# Patient Record
Sex: FEMALE | Race: WHITE | NOT HISPANIC OR LATINO | URBAN - METROPOLITAN AREA
[De-identification: names, ages, dates, MRNs, and addresses within clinical notes are randomized per-mention and may not be internally consistent; named-entity substitution may affect disease eponyms.]

---

## 2017-04-10 ENCOUNTER — COMMUNICATION - HEALTHEAST (OUTPATIENT)
Dept: SCHEDULING | Facility: CLINIC | Age: 32
End: 2017-04-10

## 2017-04-10 ENCOUNTER — OFFICE VISIT - HEALTHEAST (OUTPATIENT)
Dept: INTERNAL MEDICINE | Facility: CLINIC | Age: 32
End: 2017-04-10

## 2017-04-10 DIAGNOSIS — J32.9 SINUSITIS: ICD-10-CM

## 2017-04-24 ENCOUNTER — OFFICE VISIT - HEALTHEAST (OUTPATIENT)
Dept: FAMILY MEDICINE | Facility: CLINIC | Age: 32
End: 2017-04-24

## 2017-04-24 DIAGNOSIS — H66.92 LEFT ACUTE OTITIS MEDIA: ICD-10-CM

## 2017-04-24 DIAGNOSIS — R42 DIZZINESS: ICD-10-CM

## 2018-08-04 ENCOUNTER — OFFICE VISIT - HEALTHEAST (OUTPATIENT)
Dept: FAMILY MEDICINE | Facility: CLINIC | Age: 33
End: 2018-08-04

## 2018-08-04 ENCOUNTER — HOSPITAL ENCOUNTER (OUTPATIENT)
Dept: ULTRASOUND IMAGING | Facility: CLINIC | Age: 33
Discharge: HOME | End: 2018-08-04
Attending: FAMILY MEDICINE

## 2018-08-04 ENCOUNTER — HOSPITAL ENCOUNTER (OUTPATIENT)
Dept: CT IMAGING | Facility: CLINIC | Age: 33
Discharge: HOME | End: 2018-08-04
Attending: FAMILY MEDICINE

## 2018-08-04 DIAGNOSIS — R10.31 ABDOMINAL PAIN, RLQ (RIGHT LOWER QUADRANT): ICD-10-CM

## 2018-08-04 DIAGNOSIS — N94.9 ADNEXAL CYST: ICD-10-CM

## 2018-08-04 DIAGNOSIS — N83.201 RIGHT OVARIAN CYST: ICD-10-CM

## 2018-08-04 LAB
ALBUMIN UR-MCNC: NEGATIVE MG/DL
APPEARANCE UR: CLEAR
BILIRUB UR QL STRIP: NEGATIVE
COLOR UR AUTO: YELLOW
ERYTHROCYTE [DISTWIDTH] IN BLOOD BY AUTOMATED COUNT: 14.3 % (ref 11–14.5)
GLUCOSE UR STRIP-MCNC: NEGATIVE MG/DL
HCT VFR BLD AUTO: 39.5 % (ref 35–47)
HGB BLD-MCNC: 12.6 G/DL (ref 12–16)
HGB UR QL STRIP: NEGATIVE
KETONES UR STRIP-MCNC: NEGATIVE MG/DL
LEUKOCYTE ESTERASE UR QL STRIP: NEGATIVE
MCH RBC QN AUTO: 26.1 PG (ref 27–34)
MCHC RBC AUTO-ENTMCNC: 32 G/DL (ref 32–36)
MCV RBC AUTO: 82 FL (ref 80–100)
NITRATE UR QL: NEGATIVE
PH UR STRIP: 6.5 [PH] (ref 5–8)
PLATELET # BLD AUTO: 376 THOU/UL (ref 140–440)
PMV BLD AUTO: 7.7 FL (ref 7–10)
RBC # BLD AUTO: 4.84 MILL/UL (ref 3.8–5.4)
SP GR UR STRIP: 1.01 (ref 1–1.03)
UROBILINOGEN UR STRIP-ACNC: NORMAL
WBC: 8.7 THOU/UL (ref 4–11)

## 2018-08-06 ENCOUNTER — COMMUNICATION - HEALTHEAST (OUTPATIENT)
Dept: FAMILY MEDICINE | Facility: CLINIC | Age: 33
End: 2018-08-06

## 2018-10-25 ENCOUNTER — OFFICE VISIT - HEALTHEAST (OUTPATIENT)
Dept: FAMILY MEDICINE | Facility: CLINIC | Age: 33
End: 2018-10-25

## 2018-10-25 DIAGNOSIS — F33.0 MAJOR DEPRESSIVE DISORDER, RECURRENT EPISODE, MILD (H): ICD-10-CM

## 2018-10-25 DIAGNOSIS — R53.83 FATIGUE: ICD-10-CM

## 2018-10-25 DIAGNOSIS — E66.01 MORBID OBESITY (H): ICD-10-CM

## 2018-10-25 DIAGNOSIS — Z13.220 SCREENING FOR LIPID DISORDERS: ICD-10-CM

## 2018-10-25 DIAGNOSIS — Z00.00 ROUTINE HEALTH MAINTENANCE: ICD-10-CM

## 2018-10-25 DIAGNOSIS — Z13.1 SCREENING FOR DIABETES MELLITUS: ICD-10-CM

## 2018-10-25 LAB
ANION GAP SERPL CALCULATED.3IONS-SCNC: 14 MMOL/L (ref 5–18)
BUN SERPL-MCNC: 7 MG/DL (ref 8–22)
CALCIUM SERPL-MCNC: 9.6 MG/DL (ref 8.5–10.5)
CHLORIDE BLD-SCNC: 102 MMOL/L (ref 98–107)
CHOLEST SERPL-MCNC: 193 MG/DL
CO2 SERPL-SCNC: 22 MMOL/L (ref 22–31)
CREAT SERPL-MCNC: 0.68 MG/DL (ref 0.6–1.1)
ERYTHROCYTE [DISTWIDTH] IN BLOOD BY AUTOMATED COUNT: 14.3 % (ref 11–14.5)
FASTING STATUS PATIENT QL REPORTED: ABNORMAL
GFR SERPL CREATININE-BSD FRML MDRD: >60 ML/MIN/1.73M2
GLUCOSE BLD-MCNC: 82 MG/DL (ref 70–125)
HCT VFR BLD AUTO: 39.3 % (ref 35–47)
HDLC SERPL-MCNC: 48 MG/DL
HGB BLD-MCNC: 13.1 G/DL (ref 12–16)
LDLC SERPL CALC-MCNC: 112 MG/DL
MCH RBC QN AUTO: 27.1 PG (ref 27–34)
MCHC RBC AUTO-ENTMCNC: 33.3 G/DL (ref 32–36)
MCV RBC AUTO: 82 FL (ref 80–100)
PLATELET # BLD AUTO: 379 THOU/UL (ref 140–440)
PMV BLD AUTO: 7.6 FL (ref 7–10)
POTASSIUM BLD-SCNC: 4.1 MMOL/L (ref 3.5–5)
RBC # BLD AUTO: 4.82 MILL/UL (ref 3.8–5.4)
SODIUM SERPL-SCNC: 138 MMOL/L (ref 136–145)
TRIGL SERPL-MCNC: 164 MG/DL
TSH SERPL DL<=0.005 MIU/L-ACNC: 2.77 UIU/ML (ref 0.3–5)
WBC: 10.8 THOU/UL (ref 4–11)

## 2018-10-25 ASSESSMENT — MIFFLIN-ST. JEOR: SCORE: 1771.01

## 2018-10-26 ENCOUNTER — COMMUNICATION - HEALTHEAST (OUTPATIENT)
Dept: FAMILY MEDICINE | Facility: CLINIC | Age: 33
End: 2018-10-26

## 2018-10-26 DIAGNOSIS — F33.0 MAJOR DEPRESSIVE DISORDER, RECURRENT EPISODE, MILD (H): ICD-10-CM

## 2018-10-26 LAB
25(OH)D3 SERPL-MCNC: 28.5 NG/ML (ref 30–80)
25(OH)D3 SERPL-MCNC: 28.5 NG/ML (ref 30–80)

## 2018-11-26 ENCOUNTER — COMMUNICATION - HEALTHEAST (OUTPATIENT)
Dept: FAMILY MEDICINE | Facility: CLINIC | Age: 33
End: 2018-11-26

## 2018-12-23 ENCOUNTER — COMMUNICATION - HEALTHEAST (OUTPATIENT)
Dept: FAMILY MEDICINE | Facility: CLINIC | Age: 33
End: 2018-12-23

## 2018-12-23 DIAGNOSIS — E66.813 CLASS 3 SEVERE OBESITY WITHOUT SERIOUS COMORBIDITY WITH BODY MASS INDEX (BMI) OF 50.0 TO 59.9 IN ADULT, UNSPECIFIED OBESITY TYPE (H): ICD-10-CM

## 2018-12-23 DIAGNOSIS — E66.01 CLASS 3 SEVERE OBESITY WITHOUT SERIOUS COMORBIDITY WITH BODY MASS INDEX (BMI) OF 50.0 TO 59.9 IN ADULT, UNSPECIFIED OBESITY TYPE (H): ICD-10-CM

## 2018-12-23 DIAGNOSIS — F33.0 MAJOR DEPRESSIVE DISORDER, RECURRENT EPISODE, MILD (H): ICD-10-CM

## 2018-12-27 ENCOUNTER — OFFICE VISIT - HEALTHEAST (OUTPATIENT)
Dept: FAMILY MEDICINE | Facility: CLINIC | Age: 33
End: 2018-12-27

## 2018-12-27 ENCOUNTER — RECORDS - HEALTHEAST (OUTPATIENT)
Dept: GENERAL RADIOLOGY | Facility: CLINIC | Age: 33
End: 2018-12-27

## 2018-12-27 DIAGNOSIS — L03.011 CELLULITIS OF FINGER OF RIGHT HAND: ICD-10-CM

## 2018-12-27 DIAGNOSIS — M79.644 PAIN OF RIGHT THUMB: ICD-10-CM

## 2018-12-27 DIAGNOSIS — M79.644 PAIN IN RIGHT FINGER(S): ICD-10-CM

## 2019-01-28 ENCOUNTER — AMBULATORY - HEALTHEAST (OUTPATIENT)
Dept: LAB | Facility: CLINIC | Age: 34
End: 2019-01-28

## 2019-01-28 ENCOUNTER — COMMUNICATION - HEALTHEAST (OUTPATIENT)
Dept: SURGERY | Facility: CLINIC | Age: 34
End: 2019-01-28

## 2019-01-28 ENCOUNTER — OFFICE VISIT - HEALTHEAST (OUTPATIENT)
Dept: SURGERY | Facility: CLINIC | Age: 34
End: 2019-01-28

## 2019-01-28 DIAGNOSIS — R79.89 LOW VITAMIN D LEVEL: ICD-10-CM

## 2019-01-28 LAB
HBA1C MFR BLD: 5.5 % (ref 4.2–6.1)
VIT B12 SERPL-MCNC: 586 PG/ML (ref 213–816)

## 2019-01-28 ASSESSMENT — MIFFLIN-ST. JEOR: SCORE: 1794.82

## 2019-03-18 ENCOUNTER — OFFICE VISIT - HEALTHEAST (OUTPATIENT)
Dept: SURGERY | Facility: CLINIC | Age: 34
End: 2019-03-18

## 2019-03-18 DIAGNOSIS — Z71.3 NUTRITIONAL COUNSELING: ICD-10-CM

## 2019-03-18 DIAGNOSIS — E66.01 OBESITY, MORBID, BMI 50 OR HIGHER (H): ICD-10-CM

## 2019-03-18 DIAGNOSIS — E55.9 VITAMIN D DEFICIENCY: ICD-10-CM

## 2019-03-18 ASSESSMENT — MIFFLIN-ST. JEOR: SCORE: 1828.27

## 2020-01-08 ENCOUNTER — COMMUNICATION - HEALTHEAST (OUTPATIENT)
Dept: FAMILY MEDICINE | Facility: CLINIC | Age: 35
End: 2020-01-08

## 2020-01-08 DIAGNOSIS — F33.0 MAJOR DEPRESSIVE DISORDER, RECURRENT EPISODE, MILD (H): ICD-10-CM

## 2020-01-31 ENCOUNTER — OFFICE VISIT - HEALTHEAST (OUTPATIENT)
Dept: FAMILY MEDICINE | Facility: CLINIC | Age: 35
End: 2020-01-31

## 2020-01-31 DIAGNOSIS — Z13.1 SCREENING FOR DIABETES MELLITUS: ICD-10-CM

## 2020-01-31 DIAGNOSIS — M77.42 METATARSALGIA OF LEFT FOOT: ICD-10-CM

## 2020-01-31 DIAGNOSIS — Z00.00 ROUTINE HEALTH MAINTENANCE: ICD-10-CM

## 2020-01-31 DIAGNOSIS — Z12.4 SCREENING FOR MALIGNANT NEOPLASM OF CERVIX: ICD-10-CM

## 2020-01-31 DIAGNOSIS — F33.0 MAJOR DEPRESSIVE DISORDER, RECURRENT EPISODE, MILD (H): ICD-10-CM

## 2020-01-31 DIAGNOSIS — Z13.220 SCREENING FOR LIPID DISORDERS: ICD-10-CM

## 2020-01-31 DIAGNOSIS — E55.9 VITAMIN D DEFICIENCY: ICD-10-CM

## 2020-01-31 RX ORDER — CHLORAL HYDRATE 500 MG
CAPSULE ORAL
Status: SHIPPED | COMMUNITY
Start: 2020-01-31

## 2020-01-31 ASSESSMENT — ANXIETY QUESTIONNAIRES
7. FEELING AFRAID AS IF SOMETHING AWFUL MIGHT HAPPEN: SEVERAL DAYS
1. FEELING NERVOUS, ANXIOUS, OR ON EDGE: MORE THAN HALF THE DAYS
3. WORRYING TOO MUCH ABOUT DIFFERENT THINGS: NEARLY EVERY DAY
GAD7 TOTAL SCORE: 14
2. NOT BEING ABLE TO STOP OR CONTROL WORRYING: NEARLY EVERY DAY
6. BECOMING EASILY ANNOYED OR IRRITABLE: MORE THAN HALF THE DAYS
IF YOU CHECKED OFF ANY PROBLEMS ON THIS QUESTIONNAIRE, HOW DIFFICULT HAVE THESE PROBLEMS MADE IT FOR YOU TO DO YOUR WORK, TAKE CARE OF THINGS AT HOME, OR GET ALONG WITH OTHER PEOPLE: VERY DIFFICULT
4. TROUBLE RELAXING: MORE THAN HALF THE DAYS
5. BEING SO RESTLESS THAT IT IS HARD TO SIT STILL: SEVERAL DAYS

## 2020-01-31 ASSESSMENT — PATIENT HEALTH QUESTIONNAIRE - PHQ9: SUM OF ALL RESPONSES TO PHQ QUESTIONS 1-9: 14

## 2020-01-31 ASSESSMENT — MIFFLIN-ST. JEOR: SCORE: 1931.47

## 2020-02-03 LAB
HPV SOURCE: NORMAL
HUMAN PAPILLOMA VIRUS 16 DNA: NEGATIVE
HUMAN PAPILLOMA VIRUS 18 DNA: NEGATIVE
HUMAN PAPILLOMA VIRUS FINAL DIAGNOSIS: NORMAL
HUMAN PAPILLOMA VIRUS OTHER HR: NEGATIVE
SPECIMEN DESCRIPTION: NORMAL

## 2020-02-06 ENCOUNTER — COMMUNICATION - HEALTHEAST (OUTPATIENT)
Dept: FAMILY MEDICINE | Facility: CLINIC | Age: 35
End: 2020-02-06

## 2020-02-06 DIAGNOSIS — F33.0 MAJOR DEPRESSIVE DISORDER, RECURRENT EPISODE, MILD (H): ICD-10-CM

## 2020-02-10 LAB
BKR LAB AP ABNORMAL BLEEDING: NO
BKR LAB AP BIRTH CONTROL/HORMONES: NORMAL
BKR LAB AP CERVICAL APPEARANCE: NORMAL
BKR LAB AP GYN ADEQUACY: NORMAL
BKR LAB AP GYN INTERPRETATION: NORMAL
BKR LAB AP HPV REFLEX: NORMAL
BKR LAB AP LMP: NORMAL
BKR LAB AP PATIENT STATUS: NORMAL
BKR LAB AP PREVIOUS ABNORMAL: NORMAL
BKR LAB AP PREVIOUS NORMAL: 2015
HIGH RISK?: NO
PATH REPORT.COMMENTS IMP SPEC: NORMAL
RESULT FLAG (HE HISTORICAL CONVERSION): NORMAL

## 2020-02-12 ENCOUNTER — AMBULATORY - HEALTHEAST (OUTPATIENT)
Dept: LAB | Facility: CLINIC | Age: 35
End: 2020-02-12

## 2020-02-12 DIAGNOSIS — E55.9 VITAMIN D DEFICIENCY: ICD-10-CM

## 2020-02-12 DIAGNOSIS — Z13.220 SCREENING FOR LIPID DISORDERS: ICD-10-CM

## 2020-02-12 DIAGNOSIS — Z13.1 SCREENING FOR DIABETES MELLITUS: ICD-10-CM

## 2020-02-12 LAB
ANION GAP SERPL CALCULATED.3IONS-SCNC: 10 MMOL/L (ref 5–18)
BUN SERPL-MCNC: 13 MG/DL (ref 8–22)
CALCIUM SERPL-MCNC: 9.2 MG/DL (ref 8.5–10.5)
CHLORIDE BLD-SCNC: 105 MMOL/L (ref 98–107)
CHOLEST SERPL-MCNC: 196 MG/DL
CO2 SERPL-SCNC: 22 MMOL/L (ref 22–31)
CREAT SERPL-MCNC: 0.7 MG/DL (ref 0.6–1.1)
FASTING STATUS PATIENT QL REPORTED: YES
GFR SERPL CREATININE-BSD FRML MDRD: >60 ML/MIN/1.73M2
GLUCOSE BLD-MCNC: 93 MG/DL (ref 70–125)
HBA1C MFR BLD: 5.2 % (ref 3.5–6)
HDLC SERPL-MCNC: 53 MG/DL
LDLC SERPL CALC-MCNC: 120 MG/DL
POTASSIUM BLD-SCNC: 4.8 MMOL/L (ref 3.5–5)
SODIUM SERPL-SCNC: 137 MMOL/L (ref 136–145)
TRIGL SERPL-MCNC: 115 MG/DL

## 2020-02-13 ENCOUNTER — COMMUNICATION - HEALTHEAST (OUTPATIENT)
Dept: FAMILY MEDICINE | Facility: CLINIC | Age: 35
End: 2020-02-13

## 2020-02-13 LAB
25(OH)D3 SERPL-MCNC: 55.2 NG/ML (ref 30–80)
25(OH)D3 SERPL-MCNC: 55.2 NG/ML (ref 30–80)

## 2020-04-03 ENCOUNTER — COMMUNICATION - HEALTHEAST (OUTPATIENT)
Dept: FAMILY MEDICINE | Facility: CLINIC | Age: 35
End: 2020-04-03

## 2020-04-03 DIAGNOSIS — F33.0 MAJOR DEPRESSIVE DISORDER, RECURRENT EPISODE, MILD (H): ICD-10-CM

## 2020-06-29 ENCOUNTER — OFFICE VISIT - HEALTHEAST (OUTPATIENT)
Dept: FAMILY MEDICINE | Facility: CLINIC | Age: 35
End: 2020-06-29

## 2020-06-29 ENCOUNTER — COMMUNICATION - HEALTHEAST (OUTPATIENT)
Dept: FAMILY MEDICINE | Facility: CLINIC | Age: 35
End: 2020-06-29

## 2020-06-29 DIAGNOSIS — F33.0 MAJOR DEPRESSIVE DISORDER, RECURRENT EPISODE, MILD (H): ICD-10-CM

## 2020-06-29 DIAGNOSIS — M77.42 METATARSALGIA OF LEFT FOOT: ICD-10-CM

## 2020-06-29 RX ORDER — SERTRALINE HYDROCHLORIDE 100 MG/1
100 TABLET, FILM COATED ORAL DAILY
Qty: 90 TABLET | Refills: 3 | Status: SHIPPED | OUTPATIENT
Start: 2020-06-29

## 2020-06-29 RX ORDER — BUPROPION HYDROCHLORIDE 300 MG/1
300 TABLET ORAL EVERY MORNING
Qty: 90 TABLET | Refills: 3 | Status: SHIPPED | OUTPATIENT
Start: 2020-06-29

## 2020-06-29 ASSESSMENT — PATIENT HEALTH QUESTIONNAIRE - PHQ9: SUM OF ALL RESPONSES TO PHQ QUESTIONS 1-9: 5

## 2020-06-29 ASSESSMENT — ANXIETY QUESTIONNAIRES
7. FEELING AFRAID AS IF SOMETHING AWFUL MIGHT HAPPEN: SEVERAL DAYS
IF YOU CHECKED OFF ANY PROBLEMS ON THIS QUESTIONNAIRE, HOW DIFFICULT HAVE THESE PROBLEMS MADE IT FOR YOU TO DO YOUR WORK, TAKE CARE OF THINGS AT HOME, OR GET ALONG WITH OTHER PEOPLE: SOMEWHAT DIFFICULT
4. TROUBLE RELAXING: SEVERAL DAYS
5. BEING SO RESTLESS THAT IT IS HARD TO SIT STILL: NOT AT ALL
1. FEELING NERVOUS, ANXIOUS, OR ON EDGE: SEVERAL DAYS
6. BECOMING EASILY ANNOYED OR IRRITABLE: SEVERAL DAYS
GAD7 TOTAL SCORE: 6
2. NOT BEING ABLE TO STOP OR CONTROL WORRYING: SEVERAL DAYS
3. WORRYING TOO MUCH ABOUT DIFFERENT THINGS: SEVERAL DAYS

## 2020-07-01 ENCOUNTER — COMMUNICATION - HEALTHEAST (OUTPATIENT)
Dept: VASCULAR SURGERY | Facility: CLINIC | Age: 35
End: 2020-07-01

## 2020-07-15 ENCOUNTER — OFFICE VISIT - HEALTHEAST (OUTPATIENT)
Dept: PODIATRY | Facility: CLINIC | Age: 35
End: 2020-07-15

## 2020-07-15 DIAGNOSIS — M77.8 CAPSULITIS OF LEFT FOOT: ICD-10-CM

## 2020-07-15 RX ORDER — PREDNISONE 10 MG/1
TABLET ORAL
Qty: 30 TABLET | Refills: 0 | Status: SHIPPED | OUTPATIENT
Start: 2020-07-15

## 2021-05-26 ASSESSMENT — PATIENT HEALTH QUESTIONNAIRE - PHQ9: SUM OF ALL RESPONSES TO PHQ QUESTIONS 1-9: 5

## 2021-05-27 ASSESSMENT — PATIENT HEALTH QUESTIONNAIRE - PHQ9: SUM OF ALL RESPONSES TO PHQ QUESTIONS 1-9: 14

## 2021-05-28 ASSESSMENT — ANXIETY QUESTIONNAIRES
GAD7 TOTAL SCORE: 6
GAD7 TOTAL SCORE: 14

## 2021-05-30 ENCOUNTER — RECORDS - HEALTHEAST (OUTPATIENT)
Dept: ADMINISTRATIVE | Facility: CLINIC | Age: 36
End: 2021-05-30

## 2021-05-30 VITALS — BODY MASS INDEX: 51.79 KG/M2 | WEIGHT: 265.2 LBS

## 2021-05-30 VITALS — WEIGHT: 263.7 LBS | BODY MASS INDEX: 51.5 KG/M2

## 2021-06-01 ENCOUNTER — RECORDS - HEALTHEAST (OUTPATIENT)
Dept: ADMINISTRATIVE | Facility: CLINIC | Age: 36
End: 2021-06-01

## 2021-06-01 VITALS — BODY MASS INDEX: 50.19 KG/M2 | WEIGHT: 257 LBS

## 2021-06-02 VITALS — BODY MASS INDEX: 53.01 KG/M2 | HEIGHT: 60 IN | WEIGHT: 270 LBS

## 2021-06-02 VITALS — WEIGHT: 256 LBS | BODY MASS INDEX: 50.42 KG/M2

## 2021-06-02 VITALS — BODY MASS INDEX: 50.36 KG/M2 | WEIGHT: 256.5 LBS | HEIGHT: 60 IN

## 2021-06-02 VITALS — HEIGHT: 59 IN | WEIGHT: 263.5 LBS | BODY MASS INDEX: 53.12 KG/M2

## 2021-06-04 VITALS
SYSTOLIC BLOOD PRESSURE: 118 MMHG | HEIGHT: 60 IN | HEART RATE: 84 BPM | WEIGHT: 291 LBS | TEMPERATURE: 98.2 F | DIASTOLIC BLOOD PRESSURE: 70 MMHG | BODY MASS INDEX: 57.13 KG/M2

## 2021-06-05 NOTE — TELEPHONE ENCOUNTER
RN cannot approve Refill Request    RN can NOT refill this medication Protocol failed and NO refill given.       Bárbara Kimble, [facility] Triage/Med Refill 1/8/2020    Requested Prescriptions   Pending Prescriptions Disp Refills     buPROPion (WELLBUTRIN XL) 300 MG 24 hr tablet 90 tablet 3     Sig: Take 1 tablet (300 mg total) by mouth every morning.       Tricyclics/Misc Antidepressant/Antianxiety Meds Refill Protocol Failed - 1/8/2020  7:53 AM        Failed - PCP or prescribing provider visit in last year     Last office visit with prescriber/PCP: Visit date not found OR same dept: Visit date not found OR same specialty: 12/1/2016 Kaylee Do, [facility]  Last physical: 10/25/2018 Last MTM visit: Visit date not found   Next visit within 3 mo: Visit date not found  Next physical within 3 mo: Visit date not found  Prescriber OR PCP: Cinthia Cortez NP  Last diagnosis associated with med order: 1. Mild Recurrent Major Depression  - buPROPion (WELLBUTRIN XL) 300 MG 24 hr tablet; Take 1 tablet (300 mg total) by mouth every morning.  Dispense: 90 tablet; Refill: 3    If protocol passes may refill for 12 months if within 3 months of last provider visit (or a total of 15 months).

## 2021-06-05 NOTE — PROGRESS NOTES
FEMALE PREVENTATIVE EXAM    Assessment and Plan:       1. Routine health maintenance    2. Screening for diabetes mellitus  - Basic Metabolic Panel; Future  - Glycosylated Hemoglobin A1c; Future    3. Screening for lipid disorders  - Lipid [place] FASTING; Future    4. Vitamin D deficiency  - Vitamin D, Total (25-Hydroxy); Future    5. Screening for malignant neoplasm of cervix  - Gynecologic Cytology (PAP Smear)  - HPV High Risk DNA Cervical    6. Major depressive disorder, recurrent episode, mild (H)  Breakthrough depression on Wellbutrin.  Patient would likely benefit from therapy, but does not have the time to complete this.  We did discuss adding medication, and patient would like to try this.  Start Zoloft, 50 mg once daily.  Discussed proper use and possible side effects of this medication.  Follow-up in 4 to 6 weeks.  - buPROPion (WELLBUTRIN XL) 300 MG 24 hr tablet; Take 1 tablet (300 mg total) by mouth every morning.  Dispense: 90 tablet; Refill: 3  - sertraline (ZOLOFT) 50 MG tablet; Take 1 tablet (50 mg total) by mouth daily.  Dispense: 90 tablet; Refill: 0    7. Body mass index (BMI) of 50-59.9 in adult (H)  Patient considering seeing the bariatric clinic through [facility].  She will let me know if she needs a referral.    8. Metatarsalgia of left foot  Recommend off loading the painful part of foot with a callus pad.  Discussed this.  Recommend avoiding high heels.     Next follow up:  Return in about 4 weeks (around 2/28/2020).    Immunization Review  Adult Imm Review: No immunizations due today  BMI: 56.83    I discussed the following with the patient:   Adult Healthy Living: Importance of regular exercise  Healthy nutrition  Weight loss referral options  Getting adequate sleep  Stress management      Subjective:   Chief Complaint: Gerri Queen is an 34 y.o. female here for a preventative health visit.     HPI:  Patient is  with 3 children.  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  This is closer to home.    Has a Mirena in place.  Gets irregular bleeding with this.  Due for pap.    Complains of left plantar foot pain.  Symptoms started last month.  Feels like she is walking on a \"ball\".  Has not been wearing high heels.  Pain only with walking.  No treatments tried at home.    This year has been stressful for her.  Her youngest child is being treated for ADHD and his behaviors have been very challenging.  This has made her home life very stressful.  She has gained weight because of this.  On Wellbutrin for depression, but continues to feel down and teary.  She is teary in the clinic today.  Has not progressed with her weight loss due to stress.      Healthy Habits  Are you taking a daily aspirin? No  Do you typically exercising at least 40 min, 3-4 times per week?  NO  Do you usually eat at least 4 servings of fruit and vegetables a day, include whole grains and fiber and avoid regularly eating high fat foods? NO  Have you had an eye exam in the past two years? Yes  Do you see a dentist twice per year? Yes  Do you have any concerns regarding sleep? No    Safety Screen  If you own firearms, are they secured in a locked gun cabinet or with trigger locks? The patient declines to answer  Do you feel you are safe where you are living?: Yes (1/31/2020  2:58 PM)  Do you feel you are safe in your relationship(s)?: Yes (1/31/2020  2:58 PM)      Review of Systems:  Please see above.  The rest of the review of systems are negative for all systems.     Pap History:   No - age 30-65 PAP every 3 years recommended  Cancer Screening       Status Date      PAP SMEAR Next Due 5/1/2020      Done 5/1/2015 GYNECOLOGIC CYTOLOGY (PAP SMEAR)     Patient has more history with this topic...          Patient Care Team:  Cinthia Cortez NP as PCP - General (Family Medicine)  Cinthia Cortez NP as Assigned PCP        History     Reviewed By Date/Time Sections Reviewed    Cinthia Cortez NP " 1/31/2020  3:09 PM Tobacco    Christopher Keyana BLANCO, MA 1/31/2020  2:58 PM Tobacco            Objective:   Vital Signs:   Visit Vitals  /70   Pulse 84   Temp 98.2  F (36.8  C)   Ht 5' (1.524 m)   Wt (!) 291 lb (132 kg)   BMI 56.83 kg/m           PHYSICAL EXAM  General Appearance: Alert, cooperative, no distress, appears stated age  Head: Normocephalic, without obvious abnormality, atraumatic  Eyes: PERRL, conjunctiva/corneas clear, EOM's intact  Ears: Normal TM's and external ear canals, both ears  Nose: Nares normal, septum midline,mucosa normal, no drainage  Throat: Lips, mucosa, and tongue normal; teeth and gums normal  Neck: Supple, symmetrical, trachea midline, no adenopathy;  thyroid: not enlarged, symmetric, no tenderness/mass/nodules  Back: Symmetric, no curvature, ROM normal, no CVA tenderness  Lungs: Clear to auscultation bilaterally, respirations unlabored  Breasts: No breast masses, tenderness, asymmetry, or nipple discharge.  Heart: Regular rate and rhythm, S1 and S2 normal, no murmur, rub, or gallop  Abdomen: Soft, non-tender, bowel sounds active all four quadrants, no masses, no organomegaly  Pelvic:Normally developed genitalia with no external lesions or eruptions. Vagina and cervix show no lesions, inflammation, discharge or tenderness. No cystocele, No rectocele. No adnexal mass or tenderness.  Extremities: Extremities normal, atraumatic, no cyanosis or edema  Skin: Skin color, texture, turgor normal, no rashes  Lymph nodes: Cervical, supraclavicular, and axillary nodes normal  Neurologic: Normal   Psychologic: appropriate affective, answers all of my questions appropriately. No hallucinations, delusion, or suicidal ideations.    PHQ-9 Total Score: 14 (1/31/2020  3:00 PM)    KAI 7 Total Score: 14 (1/31/2020  3:00 PM)    Cinthia Cortez NP

## 2021-06-05 NOTE — TELEPHONE ENCOUNTER
FYI - Status Update  Who is Calling: Patient  Update: Patient is out of medication tomorrow and is scheduled on 1/31/20 for clinic appointment.  Patient is requesting medication coverage to reach this appointment.  Okay to leave a detailed message?:  Yes

## 2021-06-09 NOTE — PROGRESS NOTES
"Gerri Queen is a 34 y.o. female who is being evaluated via a billable telephone visit.      The patient has been notified of following:     \"This telephone visit will be conducted via a call between you and your physician/provider. We have found that certain health care needs can be provided without the need for a physical exam.  This service lets us provide the care you need with a short phone conversation.  If a prescription is necessary we can send it directly to your pharmacy.  If lab work is needed we can place an order for that and you can then stop by our lab to have the test done at a later time.    Telephone visits are billed at different rates depending on your insurance coverage. During this emergency period, for some insurers they may be billed the same as an in-person visit.  Please reach out to your insurance provider with any questions.    If during the course of the call the physician/provider feels a telephone visit is not appropriate, you will not be charged for this service.\"    Patient has given verbal consent to a Telephone visit? Yes    What phone number would you like to be contacted at? [phone]    Patient would like to receive their AVS by AVS Preference: Rhiannon.    Patient presents via telephone visit with complaints of continued foot pain.  I originally saw patient in January for this.  I diagnosed her with metatarsalgia of the left foot and recommending padding/off-loading the pad of the foot.  Unfortunately, patient continues to have considerable left plantar foot pain with walking or weight bearing.  She has tried several metatarsal plantar foot pads and several different types of shoes without significant benefit.  She is frustrated and this has been limiting her ability to be as active as she would like.      Patient is also following up regarding mood.  We added Zoloft to her Wellbutrin at her last visit.  She is now taking 100 mg.  Patient's depression symptoms have " improved with the addition of the Zoloft.  No significant side effects. She would like to continue both medications.     PHQ-9 Total Score: 5 (6/29/2020  2:57 PM)    KAI 7 Total Score: 6 (6/29/2020  2:57 PM)          Assessment/Plan:  1. Metatarsalgia of left foot  Continues to have significant discomfort despite conservative management.  Will refer to podiatry for assessment and recommendations.  - Ambulatory referral to Podiatry    2. Major depressive disorder, recurrent episode, mild (H)  Mood stable on Wellbutrin and Zoloft.  Continue at current doses.         Phone call duration:  11 minutes  Start: 1326  End: 1337    Cinthia Cortez NP

## 2021-06-09 NOTE — PROGRESS NOTES
FOOT AND ANKLE SURGERY/PODIATRY CONSULT NOTE         ASSESSMENT:   Capsulitis 2nd MPJ left foot      TREATMENT:  -There is localized pain to palpation along the 2nd MPJ on the left foot, consistent with capsulitis. We discussed treatment options to include reduced walking, oral prednisone, orthotics with a metatarsal pad and steroid injection.     -She would like to try an oral steroid. Rx Prednisone x12 days.     -She will reduce walking and follow-up with me in 3 weeks if symptoms do not improve.     Ivan Brush DPM  [facility] Podiatry/Foot & Ankle Surgery        HPI: I was asked to see Gerri Queen today for left foot pain. The patient states she first noticed pain in January. 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 Denies previous treatment other than naproxen. PMH was reviewed.      Past Medical History:   Diagnosis Date     Anxiety     Created by Conversion  Replacement Utility updated for latest IMO load     Esophageal reflux     occasional PPI use.     History of anesthesia complications     nausea     Migraine Headache     Created by Conversion  Replacement Utility updated for latest IMO load     Mild Recurrent Major Depression     Created by Conversion      PONV (postoperative nausea and vomiting)     NV during CSection     Urinary incontinence     some stress incontinence.     Vitamin D deficiency     Created by Conversion  Replacement Utility updated for latest IMO load       Past Surgical History:   Procedure Laterality Date      SECTION, CLASSIC      x3      SECTION, LOW TRANSVERSE       [place] REMOVAL OF OVARIAN CYST(S)      Description: Ovarian Cystectomy;  Proc Date: 2000;     TUBAL LIGATION         No Known Allergies      Current Outpatient Medications:      acetaminophen (TYLENOL) 500 MG tablet, Take 500 mg by mouth every 6 (six) hours as needed for pain., Disp: , Rfl:      buPROPion (WELLBUTRIN XL) 300 MG 24 hr tablet,  Take 1 tablet (300 mg total) by mouth every morning., Disp: 90 tablet, Rfl: 3     cholecalciferol, vitamin D3, (VITAMIN D3) 2,000 unit cap, Take by mouth daily., Disp: , Rfl:      esomeprazole (NEXIUM) 20 MG capsule, , Disp: , Rfl:      fish oil-omega-3 fatty acids 300-1,000 mg capsule, , Disp: , Rfl:      levonorgestrel (MIRENA) 20 mcg/24 hr (5 years) IUD, 1 each by Intrauterine route., Disp: , Rfl:      MULTIVITAMIN ORAL, Take by mouth daily., Disp: , Rfl:      predniSONE (DELTASONE) 10 mg tablet, 40mg x3 days, 30mg x3 days, 20mg x3 days, 10mg x3 days., Disp: 30 tablet, Rfl: 0     sertraline (ZOLOFT) 100 MG tablet, Take 1 tablet (100 mg total) by mouth daily., Disp: 90 tablet, Rfl: 3    Family History   Problem Relation Age of Onset     Depression Mother      Obesity Mother      Other Mother         Major Depression disorder     Hypertension Father      Cardiovascular Father         Hrt Disease     Depression Sister      No Medical Problems Son      No Medical Problems Son      Diabetes Maternal Grandmother      Diabetes Paternal Grandmother        Social History     Socioeconomic History     Marital status:      Spouse name: Not on file     Number of children: 2     Years of education: Not on file     Highest education level: Not on file   Occupational History     Occupation: Nurse clinician     Employer: [employer]   Social Needs     Financial resource strain: Not on file     Food insecurity     Worry: Not on file     Inability: Not on file     Transportation needs     Medical: Not on file     Non-medical: Not on file   Tobacco Use     Smoking status: Never Smoker     Smokeless tobacco: Never Used   Substance and Sexual Activity     Alcohol use: Yes     Alcohol/week: 1.0 - 2.0 standard drinks     Types: 1 Standard drinks or equivalent per week     Comment: very occaisional     Drug use: No     Sexual activity: Yes     Partners: Male     Birth control/protection: I.U.D., Surgical   Lifestyle      Physical activity     Days per week: Not on file     Minutes per session: Not on file     Stress: Not on file   Relationships     Social connections     Talks on phone: Not on file     Gets together: Not on file     Attends Faith service: Not on file     Active member of club or organization: Not on file     Attends meetings of clubs or organizations: Not on file     Relationship status: Not on file     Intimate partner violence     Fear of current or ex partner: Not on file     Emotionally abused: Not on file     Physically abused: Not on file     Forced sexual activity: Not on file   Other Topics Concern     Not on file   Social History Narrative     Not on file       Review of Systems - 10 point Review of Systems is negative except for left foot pain which is noted in HPI.    OBJECTIVE:  Appearance: alert, well appearing, and in no distress.    There were no vitals filed for this visit.    BMI= There is no height or weight on file to calculate BMI.    General appearance: Patient is alert and fully cooperative with history & exam.  No sign of distress is noted during the visit.     Psychiatric: Affect is pleasant & appropriate.  Patient appears motivated to improve health.     Respiratory: Breathing is regular & unlabored while sitting.     HEENT: Hearing is intact to spoken word.  Speech is clear.  No gross evidence of visual impairment that would impact ambulation.      Vascular: Dorsalis pedis and posterior tibial pulses are palpable. There is pedal hair growth left.  CFT < 3 sec from anterior tibial surface to distal digits left. There is no appreciable edema noted.  Dermatologic: Turgor and texture are within normal limits. No coloration or temperature changes. No primary or secondary lesions noted.  Neurologic: All epicritic and proprioceptive sensations are grossly intact left.  Musculoskeletal: Pain to palpation 2nd MPJ left foot. No pain lessor MPJ's and 2nd or 3rd IMS left.    Imaging:     No  results found.

## 2021-06-10 NOTE — PROGRESS NOTES
Subjective:      Patient ID: Gerri Queen is a 31 y.o. female.    Chief Complaint:    HPI  Gerri Queen is a 31 y.o. female who presents today complaining of nausea and dizziness.  She had an episode about a week ago.  She was having some congestion and took some sudafed and symptoms improved.  After lunch today she developed dizziness and nausea suddenly.  She has a little rhinorrhea but the congestion has otherwise cleared.  She delivered a baby in November and is still breast feeding.  No emesis although she has been nauseated.  No fever that she has noticed.  She is still taking the sudafed every 4-6 hours at this point.  No ear pain or sore throat.  She did have double vision last week but not today.  She is unsure of when her LMP was but it has maybe been a couple of months.  She did have a tubal ligation with her delivery in March.  She is feeling unsteady on her feet due to dizziness.  No changes in her speech, loss of consciousness, confusion.  No focal numbness, tingling, or weakness.  She does have a history of GERD but isn't having any symptoms now.  No chest pain or shortness of breath with the symptoms.      Past Medical History:   Diagnosis Date     Anxiety     Created by Conversion  Replacement Utility updated for latest IMO load     Esophageal reflux     Created by Conversion      History of anesthesia complications     nausea     Migraine Headache     Created by Conversion  Replacement Utility updated for latest IMO load     Mild Recurrent Major Depression     Created by Conversion      PONV (postoperative nausea and vomiting)     NV during CSection     Vitamin D deficiency     Created by Conversion  Replacement Utility updated for latest IMO load       Past Surgical History:   Procedure Laterality Date      SECTION, LOW TRANSVERSE       [place] REMOVAL OF OVARIAN CYST(S)      Description: Ovarian Cystectomy;  Proc Date: 2000;       Family History   Problem Relation Age of Onset      Depression Mother      Hypertension Father      Depression Sister      No Medical Problems Son      No Medical Problems Son      Diabetes Maternal Grandmother      Diabetes Paternal Grandmother        Social History   Substance Use Topics     Smoking status: Never Smoker     Smokeless tobacco: Never Used     Alcohol use 0.5 oz/week     1 Standard drinks or equivalent per week      Comment: very occaisional       Review of Systems    Objective:     /72  Pulse 68  Temp 98  F (36.7  C) (Oral)   Wt (!) 265 lb 3.2 oz (120.3 kg)  LMP 03/17/2017 (LMP Unknown)  SpO2 98%  Breastfeeding? Yes  BMI 51.79 kg/m2    Physical Exam   Constitutional: She is oriented to person, place, and time. She appears well-developed and well-nourished. She appears distressed (appears to feel unsteady but gait is normal).   HENT:   Right Ear: Tympanic membrane and ear canal normal.   Left Ear: Tympanic membrane is erythematous and bulging. A middle ear effusion (cloudy fluid) is present.   Nose: Right sinus exhibits maxillary sinus tenderness and frontal sinus tenderness. Left sinus exhibits maxillary sinus tenderness and frontal sinus tenderness.   Mouth/Throat: Oropharynx is clear and moist and mucous membranes are normal.   Eyes: Conjunctivae and EOM are normal. Pupils are equal, round, and reactive to light. Right eye exhibits no nystagmus. Left eye exhibits no nystagmus.   Neck: Normal range of motion. Neck supple.   Cardiovascular: Normal rate and regular rhythm.    No murmur heard.  Pulmonary/Chest: Effort normal and breath sounds normal. No respiratory distress. She has no wheezes. She has no rales.   Lymphadenopathy:     She has no cervical adenopathy.   Neurological: She is alert and oriented to person, place, and time. No cranial nerve deficit. She displays a negative Romberg sign. Coordination and gait normal.   Reflex Scores:       Brachioradialis reflexes are 2+ on the right side and 2+ on the left side.        Patellar reflexes are 2+ on the right side and 2+ on the left side.      Procedures    Results for orders placed or performed in visit on 04/24/17   Hemoglobin   Result Value Ref Range    Hemoglobin 12.7 12.0 - 16.0 g/dL   Pregnancy (Beta-hCG, Qual), Urine   Result Value Ref Range    Pregnancy Test, Urine Negative Negative    Specific Gravity, UA 1.010 1.001 - 1.030   Glucose   Result Value Ref Range    Glucose 92 74 - 125 mg/dL    Patient Fasting > 8hrs? Unknown         Assessment / Plan:     1. Left acute otitis media  cefdinir (OMNICEF) 300 MG capsule   2. Dizziness  Hemoglobin    Glucose    Thyroid Stimulating Hormone (TSH)    Vitamin D, Total (25-Hydroxy)    Pregnancy (Beta-hCG, Qual), Urine    Glucose       Other than signs of an acute otitis media, her exam is normal.  I am suspicious that dizziness may be related to this otitis media.  She did have prompt improvement with amoxicillin initially but now that she has been off it for about 4 days symptoms are returning.  I would suspect a resistant infection.  I am going to start her on some Omnicef twice daily for 10 days.  Other measures as below.    Patient Instructions   1) Omnicef 300 mg twice daily for 10 days.  2) Plenty of fluids and rest.  3) Tylenol or Ibuprofen as needed.  4) Loratadine 10 mg daily for 14 days.  5) Flonase nasal spray 1 spray each nostril daily for 14 days.  6) We will notify you of other labs when available.  7) Follow up in 7-10 days if not improving, sooner if worsening or other concerns.

## 2021-06-10 NOTE — PROGRESS NOTES
"[facility] Note  Patient Name: Gerri Queen  Patient Age: 31 y.o.  YOB: 1985  MRN: [id number]  ?  Date of Visit: 4/10/2017  Reason for Office Visit:   Chief Complaint   Patient presents with     Sinusitis     poss sinus infect x 1 wk         HPI: Gerri Queen 31 y.o. who presents to clinic for 10 days of symptoms, has not improved. She is breastfeeding     Sinus pain/pressure, increasing productive cough, \"brown/green junk\". No f/c/n/v. Has been going to work     She has tessalon and humidifier, zyrtec.       Review of Systems: As noted in HPI     Current Scheduled Meds:  Outpatient Encounter Prescriptions as of 4/10/2017   Medication Sig Dispense Refill     acetaminophen (TYLENOL) 500 MG tablet Take 500 mg by mouth every 6 (six) hours as needed for pain.       albuterol (PROVENTIL HFA;VENTOLIN HFA) 90 mcg/actuation inhaler Inhale 2 puffs every 6 (six) hours as needed for wheezing. 1 Inhaler 1     cholecalciferol, vitamin D3, (VITAMIN D3) 2,000 unit cap Take by mouth daily.       fluticasone (FLONASE) 50 mcg/actuation nasal spray SHAKE LIQUID AND USE 1 SPRAY IN EACH NOSTRIL DAILY 48 g 3     MULTIVITAMIN ORAL Take by mouth daily.       amoxicillin (AMOXIL) 500 MG capsule Take 1 capsule (500 mg total) by mouth 2 (two) times a day for 10 days. 20 capsule 0     [DISCONTINUED] alum/mag hydrox-simethicone-diphenhydramine-lidocaine (MAGIC MOUTHWASH) suspension Swish and spit 15 mL 4 (four) times a day. 240 mL 0     [DISCONTINUED] omeprazole (PRILOSEC) 40 MG capsule Take 40 mg by mouth daily.       No facility-administered encounter medications on file as of 4/10/2017.        Objective / Physical Examination:  /74 (Patient Site: Right Arm, Patient Position: Sitting, Cuff Size: Adult Large)  Pulse 86  Temp 97.8  F (36.6  C)  Wt (!) 263 lb 11.2 oz (119.6 kg)  LMP 03/17/2017  BMI 51.5 kg/m2  Wt Readings from Last 3 Encounters:   04/10/17 (!) 263 lb 11.2 oz (119.6 kg) "   12/05/16 (!) 262 lb (118.8 kg)   12/01/16 (!) 263 lb (119.3 kg)     Body mass index is 51.5 kg/(m^2). (>25?)    General Appearance: Alert and oriented in no acute distress  Head: frontal sinuses tender to percussion   Ears: Tympanic membrane clear with landmarks well visualized bilaterally  Eyes: conjunctivae clear  Nose: Septum midline, nares patent, mucosa moist and without drainage  Throat: Lips and mucosa moist. pharynx without erythema or exudate  Neck: Supple, trachea midline. Mild left sided cervical adenopathy.  Lungs: Clear to auscultation bilaterally. Normal inspiratory and expiratory effort. No w/r/r  Cardiovascular: RRR  Integumentary: Warm and dry    Assessment / Plan / Medical Decision Making:      Encounter Diagnoses   Name Primary?     Sinusitis Yes        1. Sinusitis    10 days of symptoms. Consider bacterial rhino sinusitis as this juncture.   Will start her on amox bid x 10 days. She is breastfeeding is considered safe.   Continue warm compresses and saline nasal flushes     - amoxicillin (AMOXIL) 500 MG capsule; Take 1 capsule (500 mg total) by mouth 2 (two) times a day for 10 days.  Dispense: 20 capsule; Refill: 0    Return if symptoms worsen or fail to improve.     Total time spent with patient was 15 minutes with >50% of time spent in face-to-face counseling regarding the above plan     Gabe Eli MD  [facility]

## 2021-06-16 PROBLEM — M77.8 CAPSULITIS OF LEFT FOOT: Status: ACTIVE | Noted: 2020-07-15

## 2021-06-17 NOTE — PATIENT INSTRUCTIONS - HE
"Patient Instructions by Reilly Allan MD at 1/28/2019 11:00 AM     Author: Reilly Allan MD Service: -- Author Type: Physician    Filed: 1/28/2019 12:05 PM Encounter Date: 1/28/2019 Status: Signed    : Reilly Allan MD (Physician)       Plan:  1. Welcome to your weight loss season, we'll aim for 3 meals daily, starting with 20-28g/meal of protein, hydrating well with a minimum of 64 up to 90 oz of water daily. Try to limit after supper to just water, stop drinking 90 minutes prior to bed ideally.    2. Start finding 10 minutes at least 3-4 days weekly for exercise/weights/crossfit.    3. Given your previous use of bupropion, will add naltrexone to augment the appetite suppressive effects. STart half a tab w/ supper for 7-10 days then increase to twice daily dosing(2 meals).     4. Labs reviewed, B12 level can be done anytime at any [facility].    5. Increase vitamin D to 4000IUs through the Winter/Spring, recheck come Summer.    6. Fish oil for triglycerides, consider ramping up to 3 grams daily. Consider Nordic Naturals.      What makes a person succeed with dramatic and sustained weight loss?    It's being at the right point in your life where you feel the need to lose the weight, not because anyone told you so but because of a voice inside of you that says, \"I am ready for this\".  You're now at a point where you may be feeling anxious, irritable and when you look in the mirror you do not recognize the person looking back.  Your true self is buried somewhere in that reflexion and you want to free it again.  This is the sort of motivation that leads to success, and it comes from you.    Because the only person that can lose that extra weight is you, I like my patients to focus on the mindset of being in Weight Loss Season.  This gives you permission to make the changes necessary to be consistent with the diet/activity and behavior changes that lead to successful, healthy weight loss.  Nearly any diet " "plan can work for weight loss, but keeping it healthy and nutrient based to prevent deficiencies/hair loss/fatigue or irritability is vital.  If you have a plan you want to try, we'll work with you to make sure no adjustments are needed to keep you healthy through your weight loss season and working with our Bariatric Nutritionists you'll be given expert guidance to customize your diet plan to suit your particular needs. If you don't have your own ideas in mind, we are always happy to suggest well researched and validated plans that provide enough food to prevent hunger but still tap into your excess fat reserves and lose weight in a sustainable fashion.  There is great evidence that lean protein/healthy fat intake with good fiber intake while minimizing simple starches/carbs produces reliable/sustainable weight loss in most people. But some feel more connected to an intermittent fasting/fast mimicking or ketogenic diet.  These protocols can be hard for many to stick with and that's why we prefer the protein/healthy fat focused diets but if these alternative strategies appeal to you, we can work with you to optimize your knowledge and results with these tools.    Losing weight is a temporary commitment, but you need to be \"All In\" to have a good weight loss season.  To avoid frustration, you have to be willing to be nearly perfect 19 out of 20 days or even better than that. But, weight loss season is generally only 4-8 months in length. After that length of time, it can be hard to maintain a negative calorie balance and our brain, motivations and metabolism will usually bring you to a plateau that cannot be broken in this modern world where other commitments start to take priority. That's when we look to stabilize the weight loss you've achieved.  If you've reached your goal by that time, fantastic, and job well done.  If there is more to go, then after a few months of stabilization, we can usually attack that " "previous plateau and break into new territory.    Because of this time limitation, we want to really get to work right away and get into a sustainable routine ASAP.  One of the best predictors of how much weight you're going to lose throughout the season is how much you lose in the first 6 weeks, so prepare well and jump in with both feet.      Occasionally, people may feel like they cannot commit fully to the changes necessary and may want to change one thing at a time and \"get used to\" the idea of losing weight.  That is OK because that is where they are in their life, and they cannot fully commit for any number of reasons.  It's part of that internal motivation and they just haven't reached PRIORTY NUMBER ONE status yet. It's possible that what they need is more time to reach that point and I am always willing to work with people that want to \"dabble\", but understand, the amount of success obtained with half measures, is much less than half results. But Behavior Change cannot occur until a person goes through the contemplation and preparation phases necessary to have successful action and we are more than willing to give you targets to move along the spectrum and get to that point where you feel ready to  commit fully to the weight loss season.      As you go through your plan, look for things to keep your motivation rolling.  The most successful people have a goal or target/reward that they are working towards.  Having a reward that celebrates your new fitness, mobility and energy is the best sort because it will encourage you to do well with the weight maintenance phase and long term lifestyle changes that promotes keeping the weight off for the long term.  Usually, \"getting healthier\" or improving blood tests or losing weight so your clothes fit better is not as internally motivating as having a tangible reward.  A more motivating reward is one that isn't food based, is affordable, but something special:  " Something you won't be getting unless you achieve your goal.   Its important to keep to the rule of success:  in order to get the reward, your goal MUST be achieved. Write this reward down, where you can look at it daily and keep it in the front of your mind as you go through your weight loss season and it will help keep you on track.    Tools that help change behavior are vital for success. The most studied and most supported tool for weight loss is nothing more than writing down your food and weight every day.  Every Day.  Accurately and completely.  When you commit to weight loss season, this information tells you whether you're getting ENOUGH food to fuel your weight loss properly as well as teaches you the interaction between different foods you eat and how your body responds with weight loss.  You'll see that sometimes after a heavy workout you don't see the scale move until 2-3 days later.  How saltier meals (chili for example) may make you retain water for 4-5 days before you see the weight come off, you'll get used to the mini-plateaus that develop after a good 3-8 lb drop in weight as well as how you break through if you keep working the diet as you should.    Weight loss is not a linear process, there are mini ups and downs.  Learning how your body loses weight and getting comfortable with that is very rewarding. The act of writing words on paper also solidifies your will power and commitment to the season of weight loss and that by itself changes your brain chemistry/appetite, motivation and prepares you for maximal success.      Behavior change is all about getting into a new routine.  The old habits and routine have to change because without changing the circumstances of how you gained your weight, it's unlikely you'll enjoy satisfying results. If you have snacking habits, like every time you walk through the kitchen you grab a little something, well, that habit has to change and be replaced by a new  "habit.  It can be something as simple as keeping a doodle pad on the counter that you make a few scribbles and then walk through the kitchen having not opened the cupboard, or starting with a glass of water and leaving the kitchen without anything else, or checking your food journal to see how many calories you have left for the day.  Boredom is the enemy as are the old habits. Break new ground and try to push those old habits into a deep hole.      Finally, exercise always helps.  While not mandatory to lose weight, every little bit helps and exercise has so many other benefits that to not work it into your plan is to miss out on all the mood, sleep, stress and general health benefits that come from making yourself a little short of breath and sweaty at least 3-4 days out of the week.  The metabolism and calorie burn benefits aside, almost every chronic ailment in medicine gets better with proper, aerobic exercise.  Allow yourself to start slow and let your body prepare itself to accept harder training 4-6 weeks down the road, but start now and commit to a plan.  Whether you have the means to hire a , join a gym or just walk out your front door or go down to your basement for a video workout, get into a exercise routine and  after 3 weeks of at least 3 times a week exercise you should be at a point where you can slowly start ramping up 10% each week to our goal of at least 150-300minutes weekly of aerobic exercise and at least twice weekly resistance training/strenghtening with weights/bands or body weight exercises.     I am a big fan of modifying the free training plan, \"Couch to 5k\" for almost all of my patients. Just type it into [employer] or look it up on your smart phone [employer] store.  To modify the plan,  you can use the training plan for whatever aerobic activity you do (bike/treadmill/elliptical/rower/pool/etc). During the \"jog\" intervals you just move a little faster or harder, or increase the tension or " "incline.  You use those little intervals to switch up the workout and recruit more muscles and pump the blood a little more and then recover again in the \"walk\" intervals by slowing down, decreasing the incline or turning down the tension.  3-4 days a week is not that much to ask and the benefits are enormous.  Start slow and develop the base from which you can then build on and reduce the risk of injury.  It's much more important 2-4 months from now to be enjoying your exercise then it is to over exert yourself at the start and hurting yourself.  Starting slowly allows your body to accept the training better down the road when the exercise becomes crucial for weight maintenance.  Without exercise down the road during your maintenance phase, all this hard work you are about to put in can be undone. It usually takes about 100-300 calories a day of exercise to maintain a weight loss and our focus during weight loss season is to generate the routine/activities and hobbies that make that enjoyable/sustainable.    Thanks for taking this first and most important step in your weight loss season.  Commit to it and we will cheerlead you all the way to success.  When things get tough or off track we'll offer guidance and analysis and when you reach your goal we'll celebrate your success.  In the end, it is all about your success and what you do with it.      Reilly Allan MD  [facility]  [phone]        Bupropion/Naltrexone Patient Information (trade name, CONTRAVE)    This medication is used for weight loss.  In studies people lost an averaged 5-8% of their starting weight compared to placebo (0-1%).    Often, this medication will be written as 2 separate prescriptions to improve cost to the patient. The trade name drug CONTRAVE comes as a combination of 8mg naltrexone/90mg bupropion and a 3 week escalating dose regimen going from one tablet daily up to a max of 2 tabs twice daily:  Week " 1: one tablet in the daytime.  Week 2: one tablet A.M.  And One tablet in the PM.  Week 3: 2 tabs AM  And One tablet in the PM.  Week 4: 2 tabs AM and 2 tabs PM.     The generic Rx I write for is as follows:  I recommend starting One 75 mg bupropion and 1/2 a tablet of naltrexone (25mg) daily for 10 days and then moving up to One 75 mg bupropion tablet twice daily and half a naltrexone tablet twice daily.  Depending on your results/tolerance, we may increase the Bupropion up to a maximum of 150 mg twice daily of the immediate release medication or one Bupropion  mg-300 mg daily. Sometimes we'll have to go slower with the dose escalation.    Like any weight loss medication, showing results and having tolerable or no side effects is vital to continuing therapy and if either no significant weight loss after 8-12 weeks or too many side effects then we would discontinue therapy and look for alternative options/assistance.    AVOID taking with high fat meals as this increases bupropion levels, but some food in the stomach can help decrease nausea side effects from the Naltrexone.    People who take Metoprolol may need to decrease their dose of metoprolol as the bupropion increases the effect of metoprolol 2-4 fold in some cases and low blood pressure/low heart rate could occur.    Patients should STOP CONTRAVE if they have a severe allergic reaction to CONTRAVE.  Patients should be told that CONTRAVE should be discontinued and not restarted if they experience a seizure while on treatment.    Patients should be advised that the excessive use or abrupt discontinuation of alcohol,benzodiazepines, antiepileptic drugs, or sedatives/hypnotics can increase the risk of seizure.    Patients should be advised to minimize or avoid use of alcohol.    Patients should be advised that if they previously used opioids, they may be more sensitive to lower doses of opioids and at risk of accidental overdose should they use opioids  after CONTRAVE treatment is discontinued or temporarily interrupted.  Patients should be advised that because naltrexone, a component of CONTRAVE, can block the effects of opioids, they will not perceive any effect if they attempt to self-administer anyopioid drug in small doses while on CONTRAVE. Further advise patients that the attempt to administer large doses of any opioid or to bypass the blockade while on CONTRAVE may lead to serious injury, coma, or death.    Patients should be off all opioids for a minimum of 7 to 10 days before starting CONTRAVE in order to avoid precipitation of withdrawal. Advise patients they should not take CONTRAVE if they have any symptoms of opioid withdrawal.   Patients should be advised to call their healthcare provider if they experience increased blood pressure or heart rate.  Patients should be advised to notify their healthcare provider if they are taking, or plan to take, any prescription or over-the-counter drugs. Concern is warranted because CONTRAVE and other drugs may affect each others metabolism.  Patients should be advised to notify their healthcare provider if they become pregnant, intend to become pregnant, or are breastfeeding during therapy.  CONTRAVE should NOT be taken if pregnant or nursing.    Patients with type 2 diabetes mellitus on antidiabetic therapy should be advised to monitor their blood glucose levels and report symptoms of hypoglycemia to their healthcare provider(s).    Patients should be advised to swallow CONTRAVE tablets whole so that the release rate is not altered. Do not chew, divide, or crush tablets if using the Trade drug Contrave, dividing the generic naltrexone is fine.    As always, if any questions/concerns, don't hesitate to call us at the [facility].    Reilly Allan MD  [phone].  1/28/2019            Weight Loss Shopping list:    Having the proper food on hand and ready to go is very important  "in losing weight.  Everybody has their own preferences/tastes so modify this list as you need but the following are foods that have been found to be helpful in following a calorie restricted diet.   If you are a person that doesn't \"like to eat my vegetables\", I recommend making smoothies and throwing the veggies into the  with some fruit and protein powder.      Fruits:  Generally limit to 2-3 whole fruits a day.  Do not buy Juice.  We depend on the fiber and chewing to slow us down and get phytonutrients/antioxidants available in the skins of fruits for health benefits.  Chewing also signals our stomach to send less hunger signals to our brains:    Apples (1-2 daily), Bananas (no more than one full banana a day), Grapes (2 handfuls a day), Clementines/oranges (one daily), berries (blue/rasp/straw:  2 handfuls a day). Watermelon (cubed for easy access, small bowl).    Vegetables:  Eating raw gets most nutrient and fiber benefits but cooked veggies are fine as well, using frozen for cooking or in smoothies is fine as well.  The more chewing/crunchiness the better the hunger control.  No limit to how many a day, but you should at least get 4 handfuls a day or more minimum.  Wash and cut up your vegetables into easy to carry, on the go sizes that are easy to put in plastic bags/pyrex and carry to work/school/etc.  Frozen veggies are just fine as well.     Broccoli, Carrots (no more than 3 a day large carrots or 2 handfuls of baby carrots, as they are very sweet), celery, cucumbers, green peppers, green beans (canned or fresh/frozen), Lettuce(all types), Beets(for roasting, will likely turn urine and stool a bit purple), asparagus.  Go crazy with the veggies, just wash well prior to eating.    Protein:  Women need at least  grams of protein a day and men at least  grams a day, more is fine.  Protein is our \"brain food\" and hunger suppressor and getting enough ensures maintenance of muscle mass during " weight loss.  Vegetarians should look to balance their protein intake to get all essential amino acids and discuss with dietician if help is needed (mix of beans/soy/etc).  Protein powders or drinks count and can be a great way to control appetite during the day and easy to grab and go/carry to work/etc.  Premier Protein, BiPro, TarasWhey are all brands with high quality whey protein. For whey sensitive people, rice protein is an option as well.    Canned tuna/sardines or anchovies.  Fresh fish/salmon the day you plan to make it.  Chicken breasts/thighs (skinless while losing weight), filet mignon steak (leaner but ) or marinade sirloin (wipe off before cooking). Eggs cooked in olive oil for breakfast is a good way to get protein and good fat in the a.m. (cook on low heat to prevent transformation of olive oil into less healthy fat).  Greek Yogurt with at least 20 grams of protein per serving and less than 11 grams of carbs/sugars.  String Cheese  Cottage Cheese: 2% or fat free per your preference.            Exercise Guidance    Nearly everything that bothers us gets better when the proper amount of exercise can be done in the proper amounts.  Getting to that level safely and without injury is the key.  When it comes to weight loss, exercise is especially important in maintaining the weight loss as one of the harsh realities is that weight loss slows our metabolism.  Our brain always remembers our heaviest weight and seeks to return us to that level as it doesn't understand the concept of having too much energy, only not having enough.  As such, when we lose weight, the brain thinks we're ill or in a famine and dials back our metabolism to limit further weight loss.  This is why exercise is so important in keeping the weight off and is the main reason people have some weight regain from their low weight point after weight loss.  We have to make up that 10-20% of calories not being burned and because we  can restrict our diet only so much, exercise becomes very important in our long term healthy weigh maintenance.   Generally, for every 5% body weight reduction in their weight loss season, a person needs to add  kilocalories of exercise in their daily routine to keep that weight off for the long term.  This is why it's vital to be starting your fitness regimen during weight loss season, it becomes so vital for long term success in maintaining that weight loss.    Additionally, all sorts of good enzymes and genes turn on with exercise and our stress, sleep, mood and bodies feel better when we can get to the point of making ourselves a little sweaty and short of breath 35-50 minutes most days of the week.    Who isn't ready for exercise? Well, if you get severe dizziness/palpitations, chest pain or short of breath/faint with even minimal activity like walking across a room or you're having to pause while going up a flight of stairs, then getting your heart and/or lungs fully evaluated prior to starting an exercise regimen is recommended. Everyone else can probably start a program, but everyone may start at a different point:  Some can set a 5-10 minute walking goal and others will be able to ride their bike for an hour.  Start with where you're at and look to add 10% more each week until you're at that 150 inutes or more a week goal.    If you like to count steps, the 10,000 steps per day does correlate well with weight maintenance but try to make at least 20-25% of those steps at a brisk pace (like you are about to miss your bus).              To help lose weight in a safe way and sustainable way, I'd like to start you on a 1300 calorie diet each day.  Understanding that every 3500 calorie deficit adds up to a pound of weight reduction, with the combination of light aerobic exercise, some modest weight training and diet, we should be able to lose around 1 to 2.5lbs weekly depending on your starting height and  weight.    Hunger and fatigue are the enemies of weight loss and behavior change in general.  We become much more habit and reactive in our eating when we're hungry and/or tired and frequently have less self control.  It's not a personal failing, it's just body chemistry.   We can combat this by trying to avoid being tired and hungry at the same time.  To help this,  try to front load your calories in the first 10 hours of you day so you get into the fatigued evening hours reasonably full and you can control impulses/mindless eating a lot better and avoid those bedtime snacks/evening treats that the tired brain craves.    Weight loss goes through ups and downs and plateaus but if you stay on the program, you will enjoy success.   Commit to the process, try to be good at least 19 days out of 20 and continue to think about why you're doing this and what you're working towards. If you haven't thought of your reward for hitting your weight loss goals, think about it now. Using these little victory bribes along the way helps a lot.    Finding a diet that is satisfying and repeatable-- day in and day out, improves success.  An outline of how to break up the day's food is given below.  It is a starting point and example of what a 1300 calorie diet looks like and you can modify the listed foods to suite your particular tastes, but pay attention to portions.   Do not skip breakfast as it will leave you hungry and lead to overeating at some point during the rest of the day.  If you can make Supper the last food for the day more days of the week then not it can help.  That means that those first 10-12 hours of the day and hitting your protein/intake targets for all three meals is vital to your success and evening hunger.    Start reading labels so you know that you're getting what you think you are and start measuring foods so you can eventually look at a portion and understand how it will provide the fuel you  want.    Prepping raw veggies after you buy them (washing and putting into bags/tupperware for easy access), cooking several chicken breasts for the next 2-3 lunches and generally being able to grab and go what will keep you on target when time is short will greatly aid your success.  Prepare and plan and success will follow.    Read labels:  for protein portions/yogurt, protein bars etc looking for items with more than 10 grams of protein and less than 10 grams of sugar is very helpful.  Frozen meals should have at least 18 grams of protein, under 10 grams of sugar as well (typically around 300 calories).    Please note: if you've had previous bariatric surgery: wait 20-30 minutes before/after eating to drink your beverages to avoid early fullness/dumping syndrome/worsening malabsorption, early loss of fullness and hunger.  Start with eating your protein first, slow down your meals and chew thoroughly.          1300 calorie diet:  Think Big Breakfast, Medium Lunch, smaller dinner.    Breakfast goal of 300 calories-350 calories (egg, 1/3 to 1/2 cup cooked old fashioned oatmeal or steel cut oats and berries,3-4oz greek yogurt.)Glass of water and if you like coffee (black) or tea.        Mid morning Snack or part of lunch, about 2-2.5 hrs later: 100 calories (cottage cheese/string cheese/ fruit or banana) and a handful of cruchy/green veggies (cucumber/celery/green peppers/broccoli).  Small glass of water    Lunch:  300 calories (tuna with little seaman (no bread), apple, salad with drizzle of olive oil/balsamic vinegar for example)  Water    Snack:  100 calories.  4-5 oz Greek Yogurt with at least 17 grams of protein per serving.    Or Cottage cheese (lower sodium version preferable). Get this in about 2 hrs before you plan to have dinner. Protein drinks with at least 15-20 grams of protein and less than 6 grams of sugar could be used here to hit protein goals and decrease afternoon/evening hunger.  Glass of  water    Dinner:  350 calories (4 oz meat or fish with cooked veggies or salad with minimal dressing, one piece of bread).  Glass of water or unsweetened tea with lemon    Snack: 100 calories Medium Apple or Small handful of nuts, about 2/3 to 3/4 an ounce (almonds/walnuts/cashews or pistachios are ideal).   Glass of water.    Try to avoid all soda and juice (low sodium V8 ok once a day).   You can do it!    Choose an activity that is fun/interesting and available to you such as  Going for a swim for 15 minutes, walking 40 minutes, elliptical 20 minutes or cycling 20 minutes as many days a week as you can.  If those times are too long for your fitness level, start at 10 minutes of movement and each week try to increase by 2 minutes each week.  The first 70 minutes a week (10 minutes a day only) of exercise drops the mortality rate of a sedentary person 30%!!!!  Our goal is to work up to 150 minutes or more per week of moderate to vigorous exercise  to optimize metabolism and prevent weight regain during maintenance.     10 minutes of weight lifting can be helpful as well or using some body weight exercises like wall squats, wall pushups, seat presses, yoga moves. At least twice weekly helps maintain a good strength to weight ratio, more days is better.    If you are into strenuous weight lifting or prolonged exercise, use an online calculator for how many calories you've burned and if your exercise is lasting over 60 minutes, replace 25-30% of those calories burned immediately after exercise .  For the more limited exercise (less than 500 calories burned), there is no need to add extra food unless you notice a lot of hunger on your food journal.  Usually  Even a  calorie load after longer workouts is more than adequate.  For longer efforts, hunger will increase if you don't refuel afterwards and can get meal plan off track due to hunger, so replenish immediately after the workout to keep on the diet plan and  "feeling good.    Exercise example:  If you burned 1000 calories during the exercise, immediately (within 30 minutes) have a snack/replacement beverage totaling 250-300 calories and ideally have a 3:1 ratio of carbohydrate grams to protein grams to keep muscles ready for exercise the next day.  Have your next, full meal within 2-3 hours of exercise.    Tips for success:  KEEP A FOOD JOURNAL and a log of daily weights.  1.  Prepare proteins ahead of time (broil chicken breasts in bulk so you can grab and go), steel cut oats can be stored in casserole dish/bowl in the fridge for quick scoop in the morning and rewarm in microwave, make use of crock pot recipes (watch salt content).    2.  Drink a 8-12 oz glass of water every 2-3 hours when awake.  We often mistake hunger for thirst, especially when losing weight.    3. Remember your Reward and Motivation when things get hard.    4.  Weigh yourself every morning and record, you'll stay on track better and learn how your body loses weight. Don't worry about 1 or 2 day patterns, but when on track you'll notice good trend downward of weight over 3-4 day segments.    5. Call or use 48domain messaging if problems/concerns .    6.  Find a handful of meals/foods that keep you on track and get into a boring routine that is sustainable for you.    7.  Take a complete multivitamin just to make sure all micronutrients are adequate during weight loss.    8. If losing hair/brittle nails it usually means you are not taking enough protein.  Minimum goal is 60 grams daily of protein, most people with normal kidneys do well with upwards of 100-120 grams/day of protein. Consider taking Biotin as supplement or a \"Hair and Nail\" multivitamin.  If you are hypothyroid and losing hair, see you doctor for a check up of your levels if you haven't had one recently.    9.  Getting adequate sleep is very important for starting your day properly, when we are sleep deprived, our morning appetite is " suppressed and without eating an adequate breakfast, we overeat later in the day when we're tired.  Aim for 7 hours of sleep nightly if possible.  If you sleep is disturbed, perform some introspection on stressors/depression/anxiety/PTSD events or possible sleep disorders and we can trouble shoot solutions/evaulations if issues persist.    Exercise during the day, meditative breathing before bed and after waking and removing the television from the bedroom are easy ways to improve quality of sleep.  Most people can tolerate 1-5 mg of melatonin about an 60-90 minutes before they plan to go to bed if these measures aren't helping.    10. Relaxation.  Controlled breathing exercises can lower stress levels in the brain.  One technique is 4, 7, 8 breathing:  Place the tip of your tongue behind your front teeth, breath in through your mouth for 4 seconds, Hold it for 7 seconds and breath out slowly, making a leaky tire noise for 8 seconds.  Repeat 4 times.  Ideally do at the start and end of your day or if feeling stressed.  It works and it's why meditation/yoga/martial arts are often very breath based activities.  There are breathing techniques for alertness as well as relaxation out there and they can be quite helpful.      MEDICATIONS FOR WEIGHT LOSS    PHENTERMINE (Adipex): approved in 1959 for appetite suppression.  It has stimulant effects and cannot be used with Ritalin, Concerta, or other stimulants.  Although it is not highly addictive, it's chemically related to amphetamines which are addictive.  Occasional dependence can develop, but rarely. The most common side effects are dry mouth, increased energy and concentration, increased pulse, and constipation.  You should not take phentermine if you have glaucoma, hyperthyroidism, or uncontrolled/untreated hypertension. You should stop if dramatic mood swings, severe insomnia, palpations, chest pains, visual changes or if your Blood Pressure is consistently elevated  or any time it's over 160/90.   It's ok to go off the med for a few weeks and restart if efficacy is wearing off.  $24-$30 for 90 tablets at [facility].      TOPIRAMATE (Topamax): Anti-seizure medication, also used to prevent migraines.  Side effects include paresthesia, glaucoma, altered concentration, attention difficulties, memory and speech problems, metabolic acidosis, depression, increase in body temperature and decrease sweating, risk of kidney stones.  Do not take Topamax while taking Depakote as this can cause high ammonia levels.  You must have reliable birth control as Topamax can cause birth defects.  If prolonged use has occurred it should be tapered off slowly to avoid withdrawal issues.  Insurance usually covers Topiramate.  At higher doses, there may be some confusion/forgetfulness associated with this so we try to limit dose to under 75mg twice daily to reduce this risk. Often covered by insurance as it's used for many reasons.  Topamax will cause carbonated beverages to taste bad. A recheck of your kidney/electrolytes may occur within a few months of starting.    QSYMIA (Phentermine + Topamax):  See above information about phentermine and Topamax.  Most common side effects are paresthesia, dizziness, distortion of taste, insomnia, constipation, and dry mouth.  See above descriptions for the two individual agents.  $150-$220 per month    LOCASERIN (Belviq):  Approved in 2012. It is a serotonin 2C receptor agonist which reduces appetite and promotes feeling of fullness.  Average weight loss in 6000 patients was 3-3.7% over placebo.  Most common side effects including headache, dizziness, fatigue, nausea, dry mouth, and constipation. Locaserin should be discontinued after 12 weeks if the patient does not lose 5% of their body weight.  Use caution in patients on SSRIs, triptans, bupropion, and tramadol. Caution if using erectile dysfunction medications as prolonged erections can occur and cause  "damage.  $120-$215 per month    Liraglutide (Victoza/Saxenda):   Part of the family of Glucagon Like Peptide Agonists, liraglutide directly suppresses appetite and is often used by diabetic patients due to decrease liver production of glucose, thereby improving glucose levels.  It also slows how quickly the stomach empties. May be hard to get covered for non diabetics and is an injectable medication delivered via a injector pen. It can be very costly without insurance coverage (over $500/month).  Small risk for pancreatitis and dose should be held if increased mid abdominal pain/burning. It is not to be used if previous Multiple Endocrine Neoplasia. In rodents, may increase risk of thyroid tumors and not indicated for anyone with hx of medullary thyroid cancer as a result.  If changes in voice/swallowing should be discontinued.    WEIGHT MAINTENANCE STRATEGIES AFTER WEIGHT LOSS SEASON    According to the National Weight Control Registry there are several things that people who have lost weight and kept it off have in common. Some of them are...    1. 3 MEALS A DAY:  Make sure you are eating 3 meals each day.  No skipping meals.  80% of people who skip meals are overweight or obese.  Missing meals slows the metabolism, making it harder to maintain a healthy weight.  Starting the day with some protein (10-20 Grams minimum like an egg and some yogurt) is crucial for avoiding afternoon hunger.     2. FOOD DIARY:  Much like keeping a ledger for your checkbook, keeping a food and exercise diary helps you \"keep track\" of the balance of energy (calories) in and energy out. It also helps you recognize potential unhealthy deviations from healthy patterns before they become habit. It's a nice way to monitor whether you are getting the protein, fiber, and other nutrients that your body needs.  It also provides a reference for figuring out why things are getting off track and learning how YOUR body loses/hold on to it's weight.  " "This tool is cheap and research has proven it to be one of the biggest helps when making a diet/behavior change.    3. FOLLOW-UP:  Studies show that those who follow up with their health professional regularly maintained their weight loss and those who are \"lost to follow-up \"are at risk for regain.  Moreover, it is essential to monitor vitamin levels with laboratory studies for life following gastric bypass surgery. If your frustrated or feeling defeated that is the time to work with us rather than stop the program.    4.  HIGH FIBER/LOW FAT:  Lean sources of protein (skim milk, skinless, baked or broiled chicken breast, fish, etc.)  will help you meet your protein needs while fruits, vegetables, and whole grains will help you get the fiber that your body needs.  This is heart healthy eating and helps to keep calorie levels in balance.  Some fat is important in maintaining good so 2-3 tablespoons of olive oil, 1 oz of unflavored nuts (almonds, brazil nuts, walnuts) daily is a good way to get the good stuff or taking a teaspoon of fish oil daily ([employer] is a good brand, lemon flavored isn't too fishy).    5.  8,000 STEPS PER DAY AND AT LEAST TWICE WEEKLY STRENGTH TRAINING:  This is a \"weight maintenance dose. \"  It is essential to get your steps in every day, 7 days a week.  You don't have to \"work out \" 7 days a week, but throughout the day, getting 8000 steps will help you maintain the weight you have lost. Trying to make at least 4711-0697 steps daily at a moderate to brisk pace (about to miss the bus pace). Parking far away, taking the stairs instead of the elevator, and pacing while on the phone are some ways to help achieve this goal.  A good rule of thumb is that it takes about 100 kilocalories of exercise each day for every 5% reduction in weight during weight loss season to off set the slowed metabolism and reduce the risk of weight regain.  Since we can usually only restrict our diet so much for " "so long, exercise makes it easier to maintain on the days that we're not perfect with our diet.    6.  Eat at home 90% OF THE TIME:  People who maintain a healthy weight eat at home, or meal prepared at home, 90% of the time.  Studies show that people consume an average of 770 juan carlos when eating out at a restaurant and 440 juan carlos when eating a meal prepared at home.  This equates to almost 35 pounds of excess weight for a person who eats out once a day for 1 year.      7.  Have a reward.  People that are working towards a major habit/behavior change like weight loss do better and complete the program when they have a reward they are working towards.  It should be special, something you wouldn't otherwise allow your self and be a non-food related reward. For example: a trip, a piece of jewelry, a special outfit, an adventure or outing (ideally one that uses your fitter/new body), a new bicycle...Ideally, it celebrates your weight loss and promotes a healthy life style.    8.  Eat Breakfast, bigger is better.  A study last year showed that people lose more weight if they have a large Breakfast, medium Lunch and smaller Dinner rather than vice versa.  When fueled well during the day, we tend to move more and snack less in the evening hours.  Try it!    OTHER HELPFUL HABITS    -Minimize liquid calories.  Water is jean, unsweetened or brewed tea is also minimal calorie.   Try to minimize reliance on artificial sweeteners, less or none is probably better.    -Avoiding \"mindless\" eating, i.e., eating at the TV, and the car, in front of the computer.  Eating should have a purpose of nourishment rather than something you do when bored.   By avoiding distracted eating, this generally cuts portions/servings/snacks 10-15%.      -For treats, stop eating when you no longer taste the treat.  Think about each bite and enjoy each bite rather than racing to the finish.    -Protect your sleep and Manage your stress.  Getting out for 20-60 " minutes of walking/exercise/cycling/gardening/yardwork etc is a great way to shed the frustrations of the day and improve stress reduction and bedtime relaxation.    -Weigh Yourself every day if possible when trying to lose weight. Do it at the same time everyday, ideally in the morning before or after getting out of the shower.  Weight loss bounces up and down but over a 5-6 day period a downtrend should occur and if not, go back to the food and activity tracking and give me a call if any mysteries as to why things aren't going well and we'll look at your serving size estimations or other factors that might be impeding weight loss.    -Let supper be your last food of the day and stick to water in the evenings at least 5 days out of the week.  Having a 12-14 hour period of fasting from supper to breakfast is quite beneficial for health/longevity/blood sugar levels and weight management as long as you're adequately fueled the other 12 hours of the day.    -For people with anxiety, stress or relaxation problems, I recommend the following breathing technique:    4,7,8 breathing is a non medication based way to decrease stress and anxiety symptoms, improve sleeping issues.  It can be done as often as necessary but I recommend at to perform it idealy at bedtime and first thing in the morning to keep your stress response in a reasonable range.  To perform 4, 7, 8 breathin.  Place the tip of your tongue lightly behind the gum of your front teeth.   2.  Breath in through your mouth for 4 seconds.  3.  Hold your breath for 7 seconds.  4.  Keeping the tongue in place, breath out through your mouth with a slight hissing noise for 8 seconds.    Repeat 4 times.  Do this when going to bed and waking up each day, or if any high stress situation arises and your stress hormones will come down.          OPTIMIZING YOUR METABOLISM FOR LIFE    1.  MUSCLE MAINTENANCE: Muscle burns calories up to 70% better than fat.  As we age our  body composition changes. We lose muscle mass.  Weight training can help us keep and even build muscle mass.  Dumbbells, pushups, rubber band training, weight machines are all examples of ways to keep and/or build muscle mass.    2.  MOVE: 8000 steps daily has been shown to be a weight maintenance dose.  Aim for 10,000 steps each day. Helpfull habits include taking the stairs instead of the elevator when possible, parking at the far end of the parking lot, pacing while on the phone, and taking the dog for a walk.  Of course using a treadmill, stair climber, elliptical , and bicycle are all ways of getting 10,000 steps.  If you track activity it helps motivate further activity. It's recommended to make 20-25% of your steps each day at a brisk pace (about to miss the bus pace).  If you've lost a lot of weight, we need about  kcal of exercise most days of the week for every 5% total body weight reduction achieved to reduce the risk of regaining significant weight.  There are many activity/calorie counters available on the Internet for free.    3.  3 MEALS EACH DAY: Make sure to get your 3 meals each day.  Skipping breakfast, working through your lunch, or not eating dinner will lead to a slowing of your metabolism, increased hunger and difficulty w/ portion control/cravings. Studies show that 80% of people who skip meals are overweight or obese. Using food as medicine throughout the day to balance hunger drives/urges helps greatly.    4.  ADEQUATE PROTEIN INTAKE: Getting adequate protein is beneficial for a number of reasons: to aid the healing process, to blunt cravings immediately after eating and for a period of time after eating, to help keep blood sugars level, and to help you maintain your muscle mass.  See #1 above.  Eggs/meat/yogurt (greek/low carb) in the a.m.  Tuna/meat/fish/yogurt at lunch.  Meat, fish, beans, lentils are all good dinner options or other meals.  Women should get 60-90 grams of  "protein daily, Men  grams depending on size/muscle mass and goals as well as health history/kidney function.      10 Rules to Live By    1. Come back to earth   Try to choose the least processed forms of food--fruits, vegetables, whole grains, and high-fiber carbohydrates. Learn about \"the dirty dozen and clean fifteen\" regarding which foods may be best to spend extra on organic sourced foods.    2. Eat a rainbow often   Eat fruits or vegetables with each meal. Choose a wide variety of colors for the biggest benefit.    3. Choose lean protein  Include a lean protein source (15-20gm) with each meal. Snacks: pair a protein source with carbohydrate ie: sting cheese with a pear; peanut butter with an apple; or tuna with whole grain crackers.  If you can afford it, grass fed animal proteins have a healthier fat content than grain fed animals.    4. Pick fats that give you something back   Include healthy fats in your diet, such as olive oil, tree nuts such as almonds, walnuts, brazil nuts, cashews, pecans and pistachios; seeds (sunflower/pumpkin/mirian),  avocado, and fish (salmon,sardines, tuna)  It's hard to overeat these if in their natural forms. Generally, the more you eat, the healthier you are and the metz you feel.    5. Eat breakfast every day!  Start your day out right. Include a protein source, whole grains, with fruit or vegetable.     6. Choose 3 foods group with each meal  Aim for all three nutrients (whole carbs, lean protein, and healthy fat).  Example: Turkey sandwich (Whole wheat bread, 2oz turkey breast, 1 slice reduced fat cheese) with small side salad.    7. Stay hydrated   Dehydration means decreased performance (0.5-1.0 fluid ounces [fl oz]×body weight=fl oz of water/day). Start drinking fluids early and often!    8. Do not waste your workout   Have a pre workout snack, if you didnt just eat a balanced meal. After the workout or game, if its longer than 60 minutes of activity, have a " carbohydrate/protein recovery snack, such as 16-24 fl oz of low-fat chocolate milk or greek yogurt, followed by a balanced meal of protein and vegetable and whole grains. Don't overestimate your needs.    9. Supplement wisely  Fuel first and supplement second. If you are not getting what you need through food; and before you take any supplement, check with a registered dietitian or physician for further evaluation. He or she can recommend additional supplementation if needed, based upon your individual intake and lab results. Supplements are not for everyone and can do more harm than good without proper medical advisement.    10.  Sleep   The body recovers and repairs best when at rest! Aim for 7-8 hours of sleep.  If you snore loudly, wake but don't feel refreshed or fall asleep during the day, need many naps, you may have a sleep disorder or sleep apnea and a sleep study may be helpful.      The 80/20 rule for Maintenance  Each meal and snack is an opportunity to fuel your body optimally. Choose foods that are best for you 80% of the time and incorporate some of those foods that are maybe not the best, but may maybe your favorite, 20% of the time once you've reached your weight goals!

## 2021-06-18 NOTE — PATIENT INSTRUCTIONS - HE
"Patient Instructions by Cinthia Cortez NP at 1/31/2020  3:00 PM     Author: Cinthia Cortez NP Service: -- Author Type: Nurse Practitioner    Filed: 1/31/2020  3:26 PM Encounter Date: 1/31/2020 Status: Addendum    : Cinthia Cortez NP (Nurse Practitioner)    Related Notes: Original Note by Cinthia Cortez NP (Nurse Practitioner) filed at 1/31/2020  3:21 PM       Patient Education     Treating Metatarsalgia    Metatarsalgia is pain in the \"ball of your foot,\" the area between your arch and your toes. The pain usually starts in one or more of the five bones in this area under the toes. These bones are called the metatarsals. Often, a callus builds up in this area. In most cases, wearing low-heeled, well-cushioned shoes and filing down the callus will ease the pain. If these steps dont work, you may need to do exercises or have surgery to remove part of the bone. To take pressure off the ball of your foot and ease the pain, your healthcare provider may suggest that you do one or more of the following.  Wear shoes with padded soles  Wear shoes with thick padding in the soles. Keep heels low. Make sure the shoe is wide across the ball of your foot and your toes.  Using a metatarsal pad  Put a metatarsal pad in your shoe. This lifts and takes pressure off the painful area. To insert the pad:    Put a small lipstick leora on the callus.    Step into the shoe to leave a leora on the insole.    Peel the backing off the pad. Then put the pad in the shoe just behind the lipstick leora.  You may also be able to find inserts with built-in metatarsal pads.  Filing the callus  1. Soak your foot in warm water for a few minutes to soften the skin.  2. Dry the foot.  3. Gently rub the callus with a pumice stone or nail file to remove the hard skin. Stop if bleeding or pain occurs.  If you have diabetes, see your diabetes healthcare provider for foot care.  Date Last Reviewed: 1/1/2018 2000-2019 [employer], " [employer]. [address] [place] [address]. All rights reserved. This information is not intended as a substitute for professional medical care. Always follow your healthcare professional's instructions.      Patient Education     Prevention Guidelines, Women Ages 18 to 39  Screening tests and vaccines are an important part of managing your health. A screening test is done to find possible disorders or diseases in people who don't have any symptoms. The goal is to find a disease early so lifestyle changes can be made and you can be watched more closely to reduce the risk of disease, or to detect it early enough to treat it most effectively. Screening tests are not considered diagnostic, but are used to determine if more testing is needed. Health counseling is essential, too. Below are guidelines for these, for women ages 18 to 39. Talk with your healthcare provider to make sure youre up-to-date on what you need.  Screening Who needs it How often   Alcohol misuse All women in this age group At routine exams   Blood pressure All women in this age group Yearly checkup if your blood pressure is normal  Normal blood pressure is less than 120/80 mm Hg  If your blood pressure reading is higher than normal, follow the advice of your healthcare provider   Breast cancer All women in this age group should talk with their healthcare providers about the need for clinical breast exams (CBE)1 Clinical breast exam every 3 years1   Cervical cancer Women ages 21 and older Women between ages 21 and 29 should have a Pap test every 3 years; women between ages 30 and 65 are advised to have a Pap test plus an HPV test every 5 years   Chlamydia Sexually active women ages 25 and younger, and women at increased risk for infection (such as having multiple sex partners) Every year if you're at risk or have symptoms   Depression All women in this age group At routine exams   Type 2 diabetes, prediabetes All women with no symptoms who are  overweight or obese and have 1 or more other risk factors for diabetes At least every 3 years. Also, testing for diabetes during pregnancy after the 24th week.    Type 2 diabetes, prediabetes All women diagnosed with gestational diabetes Lifelong testing every 3 years   Type 2 diabetes All women with prediabetes Every year   Gonorrhea Sexually active women at increased risk for infection At routine exams   Hepatitis C Anyone at increased risk At routine exams   HIV All women should be tested at least once for HIV between the ages of 13 and 64 At routine exams. Those with risk factors for HIV should be tested at least annually.    Obesity All women in this age group At routine exams   Syphilis Women at increased risk for infection should talk with their healthcare provider At routine exams   Tuberculosis Women at increased risk for infection should talk with their healthcare provider Ask your healthcare provider   Vision All women in this age group At least 1 complete exam in your 20s, and 2 in your 30s   Vaccine2 Who needs it How often   Chickenpox (varicella) All women in this age group who have no record of this infection or vaccine 2 doses; the second dose should be given 4 to 8 weeks after the first dose   Hepatitis A Women at increased risk for infection should talk with their healthcare provider 2 doses given at least 6 months apart   Hepatitis B Women at increased risk for infection should talk with their healthcare provider 3 doses over 6 months; second dose should be given 1 month after the first dose; the third dose should be given at least 2 months after the second dose and at least 4 months after the first dose   Haemophilus influenzae Type B (HIB) Women at increased risk for infection should talk with their healthcare provider 1 to 3 doses   Human papillomavirus (HPV) All women in this age group up to age 26 3 doses; the second dose should be given 1 to 2 months after the first dose and the third dose  given 6 months after the first dose   Influenza (flu) All women in this age group Once a year   Measles, mumps, rubella (MMR) All women in this age group who have no record of these infections or vaccines 1 or 2 doses   Meningococcal Women at increased risk for infection should talk with their healthcare provider 1 or more doses   Pneumococcal conjugate vaccine (PCV13) and pneumococcal polysaccharide vaccine (PPSV23) Women at increased risk for infection should talk with their healthcare provider PCV13: 1 dose ages 19 to 65 (protects against 13 types of pneumococcal bacteria)  PPSV23: 1 to 2 doses through age 64, or 1 dose at 65 or older (protects against 23 types of pneumococcal bacteria)      Tetanus/diphtheria/pertussis (Td/Tdap) booster All women in this age group Td every 10 years, or a one-time dose of Tdap instead of a Td booster after age 18, then Td every 10 years   Counseling Who needs it How often   BRCA gene mutation testing for breast and ovarian cancer susceptibility Women with increased risk for having gene mutation When your risk is known   Breast cancer and chemoprevention Women at high risk for breast cancer When your risk is known   Diet and exercise Women who are overweight or obese When diagnosed, and then at routine exams   Domestic violence Women at the age in which they are able to have children At routine exams   Sexually transmitted infection prevention Women who are sexually active At routine exams   Skin cancer Prevention of skin cancer in fair-skinned adults At routine exams   Use of tobacco and the health effects it can cause All women in this age group Every visit   1 According to the ACS, women ages 20 to 39 years should have a clinical breast exam (CBE) as part of their routine health exam every 3 years. Breast self-exams are an option for women starting in their 20s. But the USPSTF does not recommend CBE.  Date Last Reviewed: 10/1/2017    6570-0671 [employer]. [address]  [place] [address]. All rights reserved. This information is not intended as a substitute for professional medical care. Always follow your healthcare professional's instructions.

## 2021-06-19 NOTE — PROGRESS NOTES
Chief Complaint   Patient presents with     Abdominal Pain     started the night before last and has gotten worse hurts when breathing deep pain is at 4-5 right now, but walking causes her to feel like she needs to bend over, has had ovian cysts in the past         HPI    Patient is here for moderate abdominal pain started at the lower central abdomen the radiated to right lower abdomen and up up to the upper quadrant. Pain has been constant. She reported mild intermittent right mid back pain. No fever, cough, nausea, vomiting, diarrhea, constipation, urinary symptoms. No hx of renal calculus. She has hx of ovarian cyst (not sure which side) s/p removal at age 15. She also has hx of C-sections. She is s/p tubal ligation. She took Ibuprofen with reasonable relief.     ROS: Pertinent ROS noted in HPI.     No Known Allergies    Patient Active Problem List   Diagnosis     Migraine Headache     Anxiety     Esophageal Reflux     Vitamin D Deficiency     Morbid Obesity     Mild Recurrent Major Depression     Pregnant     Maternal care due to low transverse uterine scar from previous  delivery       Family History   Problem Relation Age of Onset     Depression Mother      Hypertension Father      Depression Sister      No Medical Problems Son      No Medical Problems Son      Diabetes Maternal Grandmother      Diabetes Paternal Grandmother        Social History     Social History     Marital status:      Spouse name: N/A     Number of children: 2     Years of education: N/A     Occupational History     Nurse clinician [employer]     Social History Main Topics     Smoking status: Never Smoker     Smokeless tobacco: Never Used     Alcohol use 0.5 oz/week     1 Standard drinks or equivalent per week      Comment: very occaisional     Drug use: No     Sexual activity: Yes     Partners: Male     Birth control/ protection: None     Other Topics Concern     Not on file     Social History Narrative          Objective:    Vitals:    08/04/18 0943   BP: 110/70   Pulse: 88   Resp: 14   Temp: 98.1  F (36.7  C)   SpO2: 96%       Gen: well appearing  CV: RRR, no M, R, G  Pulm: CTAB  Abd: obese, unable to appreciate bowel sounds, soft, moderate pain to palpation at RLQ. Mild tenderness to palpation at R side of abdomen. No pain to palpation at LLQ, LUQ, central abdomen.   MSK: normal inspection and palpation, no CVA tenderness.     Us Pelvis With Transvaginal Non Ob    Result Date: 8/4/2018  US PELVIS WITH TRANSVAGINAL NON OB 8/4/2018 1:07 PM INDICATION: Follow up ct scan done today showing right adnexal cyst TECHNIQUE: Transabdominal scans were performed. Endovaginal ultrasound was performed to better visualize the adnexa. COMPARISON: CT abdomen and pelvis 08/04/2018 and pelvic ultrasound 08/30/2006 FINDINGS: Uterus measures 8.8 x 4.2 x 3.7 cm. Normal uterus with no masses. Endometrial thickness is 7 mm. Normal smooth endometrium.  Right ovary measures 5.0 x 4.8 x 3.7 cm. There is a 2.8 x 2.2 x 2.3 cm cystic structure correlating with recent CT findings. It is suboptimally visualized due to the patient's body habitus but shows no definite internal septation or solid component. There are minimal low level internal echoes which may reflect debris or reverberation artifact. No definite internal flow on color Doppler imaging. Left ovary measures 3.2 x 1.8 x 1.4 cm. Normal left ovary. Normal arterial duplex. No significant free fluid in the cul-de-sac.     CONCLUSION: 1.  2.8 cm right ovarian cyst is suboptimally evaluated due to technical factors. Differential considerations include a hemorrhagic cyst and endometrioma. A phone call report regarding the critical findings on this exam was made to Dr. Karan Nguyen at 1:40 PM on 08/04/2018.    Ct Abdomen Pelvis With Oral With Iv Contrast    Result Date: 8/4/2018  CT ABDOMEN PELVIS W ORAL W IV CONTRAST 8/4/2018 11:45 AM     INDICATION: Right lower quadrant abdominal pain right  lower quadrant abdominal pain TECHNIQUE: CT abdomen and pelvis. Multiplanar reformation images (MPR). Dose reduction techniques were used. IV CONTRAST: Iohexol (Omni) 100 mL COMPARISON: None. FINDINGS: LUNG BASES: Negative. ABDOMEN: The liver, gallbladder, pancreas, spleen, adrenal glands and kidneys are normal. There is no abdominal aortic aneurysm. There is no abdominal lymphadenopathy. There is no evidence for bowel obstruction or free intraperitoneal air. The appendix is normal. PELVIS: There is a rounded low-attenuation structure in the right adnexa measuring 4 x 4.2 x 4 cm likely representing a cyst. If clinically indicated further characterization with pelvic ultrasound could be obtained. There is no free fluid. No evidence for diverticulitis or abscess. No pelvic lymphadenopathy. MUSCULOSKELETAL: Negative.     CONCLUSION: 1.  4 cm cystic structure in the right adnexa. If clinically indicated further characterization with pelvic ultrasound may be helpful. 2.  No evidence for appendicitis, diverticulitis, bowel obstruction, radiopaque kidney stone or gallstone or hydronephrosis. 3.  No free fluid or free air. Findings were called to Dr. Bruno at 1159 hours on 8/4/2018.        Right ovarian cyst - advised f/u with OB this coming week.   -     oxyCODONE-acetaminophen (PERCOCET) 5-325 mg per tablet; Take 1 tablet by mouth every 4 (four) hours as needed for pain.    Adnexal cyst  -     Cancel: US Pelvis Non OB  -     US Pelvis With Transvaginal Non OB  -     oxyCODONE-acetaminophen (PERCOCET) 5-325 mg per tablet; Take 1 tablet by mouth every 4 (four) hours as needed for pain.    Abdominal pain, RLQ (right lower quadrant)  -     HM2(CBC w/o Differential)  -     Urinalysis-UC if Indicated  -     CT Abdomen Pelvis With Oral With IV Contrast  -     oxyCODONE-acetaminophen (PERCOCET) 5-325 mg per tablet; Take 1 tablet by mouth every 4 (four) hours as needed for pain.

## 2021-06-21 NOTE — PROGRESS NOTES
FEMALE PREVENTATIVE EXAM    Assessment and Plan:       1. Routine health maintenance    2. Screening for diabetes mellitus  - Basic Metabolic Panel    3. Screening for lipid disorders  - Lipid Cascade FASTING    4. Fatigue  Likely secondary to life stressors.   - Thyroid Cascade  - Vitamin D, Total (25-Hydroxy)  - HM2(CBC w/o Differential)    5. Morbid Obesity  Patient declines referral at this time.  Encouraged restarting exercise.     6. Mild Recurrent Major Depression  Will initiate medication today.  Patient did well with Wellbutrin in the past.  Start 150 mg once daily x 1 week, then increase to 300 mg thereafter.  Follow up with me in clinic or via [facility] regarding mood and refills in 6 weeks.  Discussed proper use and possible side effects of this medication.   - buPROPion (WELLBUTRIN XL) 150 MG 24 hr tablet; Take 1 tablet (150 mg total) by mouth every morning. Take 1 tablet daily x 1 week, then increase to 2 tablets daily.  Dispense: 60 tablet; Refill: 1     Next follow up:  Return in about 1 year (around 10/25/2019).    Immunization Review  Adult Imm Review: No immunizations due today Declines Td.  Believes she had one with her last child 2 years ago.  BMI: Discuss referral; declines at this time.    I discussed the following with the patient:   Adult Healthy Living: Importance of regular exercise  Healthy nutrition  Weight loss referral options  Getting adequate sleep  Stress management    I have had an Advance Directives discussion with the patient.    Subjective:   Chief Complaint: Gerri Queen is an 33 y.o. female here for a preventative health visit.     HPI:  Patient is  with 3 children.  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  She is going to school right now for her Master's degree.    Patient had a Mirena IUD placed this year due to an ovarian cyst.  She has some spotting, but no regular periods. History of tubal ligation.  Pap is UTD.    Patient was doing  Metafast for weight loss.  She was exercising over the summer, but is no longer.  She has been extremely stressed with work, children, and school.  Not sleeping well at night.  Staying up late to finish homework.  Eating and exercise has been very poor.  On Wellbutrin for short period of time after her last child - helpful.  She has noticed some hair loss and weight gain.  No significant anxiety.  She would like to discuss starting medication again for mood.  No suicidal ideations.  Not seeing a therapist.     Healthy Habits  Are you taking a daily aspirin? No  Do you typically exercising at least 40 min, 3-4 times per week?  NO  Do you usually eat at least 4 servings of fruit and vegetables a day, include whole grains and fiber and avoid regularly eating high fat foods? NO  Have you had an eye exam in the past two years? NO  Do you see a dentist twice per year? Yes  Do you have any concerns regarding sleep? YES    Safety Screen  If you own firearms, are they secured in a locked gun cabinet or with trigger locks? Yes  Do you feel you are safe where you are living?: Yes (10/25/2018  2:01 PM)  Do you feel you are safe in your relationship(s)?: Yes (10/25/2018  2:01 PM)    Review of Systems:  Please see above.  The rest of the review of systems are negative for all systems.     Pap History:   Yes - updated in Problem List and Health Maintenance accordingly  Cancer Screening       Status Date      PAP SMEAR Next Due 5/1/2020      Done 5/1/2015 GYNECOLOGIC CYTOLOGY (PAP SMEAR)     Patient has more history with this topic...          Patient Care Team:  Cinthia Cortez NP as PCP - General (Family Medicine)        History     Reviewed By Date/Time Sections Reviewed    Cinthia Cortez NP 10/25/2018  2:19 PM Tobacco    Cinthia Cortez NP 10/25/2018  2:15 PM Tobacco    Keyana White MA 10/25/2018  1:58 PM Tobacco            Objective:   Vital Signs:   Visit Vitals     /78 (Patient Site: Right Arm,  "Patient Position: Sitting, Cuff Size: Adult Large)     Pulse 80     Temp 98.2  F (36.8  C) (Oral)     Ht 4' 11.75\" (1.518 m)     Wt (!) 256 lb 8 oz (116.3 kg)     BMI 50.51 kg/m2          PHYSICAL EXAM  General Appearance: Alert, cooperative, no distress, appears stated age. Obese.  Head: Normocephalic, without obvious abnormality, atraumatic  Eyes: PERRL, conjunctiva/corneas clear, EOM's intact  Ears: Normal TM's and external ear canals, both ears  Nose: Nares normal, septum midline,mucosa normal, no drainage  Throat: Lips, mucosa, and tongue normal; teeth and gums normal  Neck: Supple, symmetrical, trachea midline, no adenopathy;  thyroid: not enlarged, symmetric, no tenderness/mass/nodules; no carotid bruit or JVD  Back: no CVA tenderness  Lungs: Clear to auscultation bilaterally, respirations unlabored  Breasts: No breast masses, tenderness, asymmetry, or nipple discharge.  Heart: Regular rate and rhythm, S1 and S2 normal, no murmur, rub, or gallop  Abdomen: Soft, non-tender, bowel sounds active all four quadrants,  no masses, no organomegaly  Extremities: Extremities normal, atraumatic, no cyanosis or edema  Skin: Skin color, texture, turgor normal, no rashes or lesions  Lymph nodes: Cervical, supraclavicular, and axillary nodes normal  Neurologic: Normal   Psychologic: appropriate affective, answers all of my questions appropriately. No hallucinations, delusion, or suicidal ideations.    Cinthia Cortez NP-C    "

## 2021-06-22 NOTE — PROGRESS NOTES
Assessment:     1. Pain of right thumb  XR Finger Right 2 or More VWS    predniSONE (DELTASONE) 20 MG tablet   2. Cellulitis of finger of right hand  cephalexin (KEFLEX) 500 MG capsule    predniSONE (DELTASONE) 20 MG tablet     Xr Finger Right 2 Or More Vws    Result Date: 12/27/2018  [facility] RADIOLOGY EXAM: XR FINGER RIGHT 2 OR MORE VWS LOCATION: [facility] DATE/TIME: 12/27/2018 9:40 AM INDICATION: Right thumb pain r/o fracture or dislocation COMPARISON: None. FINDINGS: Negative thumb. No fracture or dislocation. Tiny ossification present at the level of the first IP joint appears smoothly marginated and is likely degenerative.         Plan:     Differential diagnosis include but not limited to right thumb fracture, dislocation, cellulitis, or degenerative changes.  X-ray was done, discussed with the patient about negative x-ray results.  But the x-ray did show some degenerative changes.  I am not sure exactly was causing the patient symptoms, since her right thumb is inflamed I would treat her with Keflex for possible cellulitis.  I will also treat her with prednisolone 40 mg daily times 5 days for inflammation and swelling.  Advised patient may use ibuprofen or Tylenol for pain or discomfort.  May also use a hand splint, monitor for worsening symptoms.    I discussed red flag symptoms, return precautions to clinic/ER and follow up care with patient/parent.  Expected clinical course, symptomatic cares advised. Questions answered. Patient/parent amenable with plan.       Subjective:       33 y.o. female presents for evaluation of right thumb pain.  Patient reports that she has been experiencing the pain for about 4 days.  She is not sure what happened, she does not recall any trauma or injury.  Currently she reports that her pain is bad, pain level is about 8 out of 10.  She has been using ibuprofen, Aleve and that gives her no symptom relief..  She has been unable to use her right hand due to the  pain.  She is unable to bend her finger or extend her finger.  She admits that the pain radiates down to her wrist from her thumb.    The following portions of the patient's history were reviewed and updated as appropriate: allergies, current medications, past family history, past medical history, past social history, past surgical history and problem list.    Review of Systems  A 12 point comprehensive review of systems was negative except as noted.      Objective:      /73   Pulse 72   Temp 98.1  F (36.7  C) (Oral)   Resp 18   Wt (!) 256 lb (116.1 kg)   SpO2 97%   BMI 50.42 kg/m    General appearance: alert, appears stated age, cooperative and severe distress  Head: Normocephalic, without obvious abnormality, atraumatic  Lungs: clear to auscultation bilaterally  Heart: regular rate and rhythm, S1, S2 normal, no murmur, click, rub or gallop  Extremities: edema Right thumb, +2 edema and Limited range of motion with hyperextension and flexion  Pulses: 2+ and symmetric  Skin: Right thumb with erythema, swelling no drainage noted, no open areas  Lymph nodes: Cervical, supraclavicular, and axillary nodes normal.  Neurologic: Grossly normal     This note has been dictated using voice recognition software. Any grammatical or context distortions are unintentional and inherent to the software

## 2021-06-23 NOTE — PROGRESS NOTES
[facility]:  Non-Surgical Weight Loss Intake Appointment   Date of visit: 1/28/2019  Physician: Reilly Allan MD  Primary Care is Cinthia Cortez NP.  Gerri Queen   33 y.o.  female  Gerri is a 33 y.o. year old female who is here today for consultation regarding non-surgical weight loss.   she was referred to the [facility] by self referral. She's tried numerous commercial programs in the past and is currently interested in a non surgical, medication supported plan.  We discussed that given her full time job, 3 kids under 10 years old (one 1 yo) and attending school she will need to be quite focused to succeed.  We discussed that the surgical tool may be most appropriate given her super morbid obesity but she's not prepared to undertake that procedure in the near future.  She was invited to view the online seminar for more information.    Weight History:   Wt Readings from Last 3 Encounters:   01/28/19 (!) 263 lb 8 oz (119.5 kg)   12/27/18 (!) 256 lb (116.1 kg)   10/25/18 (!) 256 lb 8 oz (116.3 kg)     Body mass index is 52.77 kg/m .       Assessment and Plan   Assessment: Gerri Queen is a 33 y.o.,female presenting for assistance with medical weight loss.  Identified issues contributing to her excess weight include:          With a busy schedule, long commute ([place] to [place]), now attending school she has a combination of sedentary lifestyle w/ some reliance on convenience eating.  Her diet is protein poor and high in unhealthy fats, low in fiber and high in refined carbohydrates.    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   She is willing to begin a fitness routine and we discussed options to start with even now.    She is already on bupropion and as such, was open to the addition of naltrexone in order to optimize the appetite suppressant effects of bupropion along with some crave reduction  innate to the naltrexone.  She's on no obesogenic drugs.        Plan:  1.  Behavior Goals: 3 daily meals plan, ideally with a large breakfast, medium lunch        and smaller dinner. Avoidance of sugared-sweetened beverages and processed        carbohydrate snacks.  Work towards pre-planning meals to avoid falling back into old             habits/obesogenic habits.  Daily Food Tracking and morning weight recommended.  [facility]       [facility] to [facility].    2.  Diet Goals: Recommend a protein focus of 20-28g/meal, 3 meals daily, discontinue sugary beverages and be mindful of cravings if using artificially sweetened products..      3.  Exercise/Activity Goals: 3-4 days weekly with aim to start with at least 10 minutes of aerobic/strength training regimen.  She did some crossfit last summer and liked it. Encouraged to continue body weight based exercises. Educated on the need to work up to having about 100kcal/day of exercise per every 5% total body weight reduction. Long term goal of increasing endurance to allow for at least 200 minutes weekly of moderate exercise to maintain weight loss.      4.  Recommendation regarding weight loss medication:  Naltrexone added to her previous bupropion. 25mg titrated up to two times a day dosing with 2 meals.  She'll investigate the coverage she may have for liraglutide for weight loss and we could consider adding that agent in the future.    5.  Referral to Dietician for further education and customization of diet plan.    6.  Stress Reduction:planning and some personal fitness time.    7.  Intake Labs:  Reviewed recent studies, will check B12 level.     8.   Low vitamin D. Is on 2000IUs daily, recommend she increase to 4000IUs until Summer then recheck.    1. Body mass index (BMI) of 50-59.9 in adult (H)  naltrexone (DEPADE) 50 mg tablet    Vitamin B12    Glycosylated Hemoglobin A1c   2. Low vitamin D level            Return in about 6 weeks (around  3/11/2019).     Weight and Lifestyle History    Sleep history:  Goes to bed 10:30-11pm, once asleep may have either kids waking (2,6, and 8) or thoughts on her agenda for work that wake her frequently. Will wake to her child or alarm between 5-5:30am.  Once awake, gets dressed, gets kids up and out the door by 6:30am. Will either grab something on the way in to work/school:  Egg bake or frozen turkey sandwich. Rare coffee (frufru coffee once monthly). 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  At work, may have some oatmeal at desk if didn't eat. May work through lunch, noon will have, aníbal cari's/fast food, water through the day. Will have 1-2 diet pepsi's daily (cans or bottles). Quitting time is 4:30pm, commute   Hours per night: 6-6.5  Snoring/apnea/insomnia? no  Restful/refreshed? Some fragmentation by her busy life/young child  Shift work? No, works at [employer] [place]  CPAP/BiPAP? no  Sleep study previously? no  TV in bedroom? na  Sleep aids/pills? no      STOP-BANG score for sleep apnea : 2  For general population   SUJATA - Low Risk : Yes to 0 - 2 questions  SUJATA - Intermediate Risk : Yes to 3 - 4 questions  SUJATA - High Risk : Yes to 5 - 8 questions  or Yes to 2 or more of 4 STOP questions + male gender  or Yes to 2 or more of 4 STOP questions + BMI > 35kg/m2  or Yes to 2 or more of 4 STOP questions + neck circumference 17 inches / 43cm in male or 16 inches / 41cm in female    Weight History:  In what way is your excess weight affecting your wellness/health? More trouble getting around/winded  Heaviest weight: near current levels  Any Previous weight loss, what was the most lost and method: WW, Metagenics, self guided.  Birth weIight, High School graduation weight: 130 lbs  Lowest adult weight: na  Maternal health/smoking/weight: na  When did you start gaining weight and what were the circumstances? Increased post kids.  Is anyone else in your immediate family  overweight? no  Finally,  Is there a weight you desire to be, what is your goal weight that would define successful weight loss for you? Under 200 lbs to start,    I would like to lose weight so I can  :  Feel better.   .     Barriers to weight loss:  Is anyone else at home/work/family a barrier to your weight loss? Short on time.  Work schedule/location? See above.  Current stressors include: work/kids/school all quite busy.  Mobility problems: just fatigue.    Counseled on health benefits of weight loss, goals for metabolic/diabetic risk reduction, HTN reduction, improved sleep and mobility, cancer reduction, longevity.    Fitness History:  Were you ever an athlete?na     Which sports? na  When was the last time you walked/hiked a mile?na  Do you have any limitations for activity?no  Do you have access to a gym, pool, club, fitness center, or ?has weights. Has tried some cross fit last summer.     Do you have any fitness goals/dreams that could motivate your weight loss?ready to go.    On a scale from 0-10,  how willing are you to start some sort of movement/exercise regimen? na    Counseled on physiologic benefits of exercise and for long term weight maintenance, goal working towards 200-300 minutes weekly.     Food History:  Who buys groceries?  shared  Do you eat at dinner table, is the TV on or off, family present? Table usually. Some commute eating.  Any soda, how much? 1-2 diet sodas regularly. Once weekly high sugar/fat coffee drink.  Meals per day? 3   Snacks per day? 1-2  Breakfast typically is: see above  Lunch typically is: see above  Dinner  is: simple  Weekly Fast Food/dining out: lunch most days.  Snacks consist of: na    Food sensitivities/allergies/intolerances/avoidances: no  Water per day? 1 gallon.    Any binging behavior?no  Any induced vomiting/purging? no  Any history of anorexia/bulimia? no  Any night eating issues? no  Stress induced eating? occasional    Goal Setting:  Short Term  "Goals10% reduction is 26 lbs, 237 lbs.  Long Term Goals under 200 lbs then under BMI 30.         Patient Profile   Social History     Social History Narrative     Not on file     Family History   Problem Relation Age of Onset     Depression Mother      Obesity Mother      Other Mother         Major Depression disorder     Hypertension Father      Cardiovascular Father         Hrt Disease     Depression Sister      No Medical Problems Son      No Medical Problems Son      Diabetes Maternal Grandmother      Diabetes Paternal Grandmother           Past Medical History   Patient Active Problem List   Diagnosis     Migraine Headache     Anxiety     Esophageal Reflux     Vitamin D Deficiency     Morbid Obesity     Mild Recurrent Major Depression     Patient has no known allergies.  Current Outpatient Medications   Medication Sig Note     acetaminophen (TYLENOL) 500 MG tablet Take 500 mg by mouth every 6 (six) hours as needed for pain.      buPROPion (WELLBUTRIN XL) 300 MG 24 hr tablet Take 1 tablet (300 mg total) by mouth every morning.      cholecalciferol, vitamin D3, (VITAMIN D3) 2,000 unit cap Take by mouth daily.      levonorgestrel (MIRENA) 20 mcg/24 hr (5 years) IUD 1 each by Intrauterine route. 2019: For menorrhagia issues.     MULTIVITAMIN ORAL Take by mouth daily.      naltrexone (DEPADE) 50 mg tablet Start half tab daily for 10 days then increase to half tab twice daily (breakfast and supper).        Past Surgical History  She has a past surgical history that includes pr removal of ovarian cyst(s);  section, low transverse;  section, classic; Tubal ligation; and REPEAT  SECTION WITH TUBAL LIGATION (N/A, 2016).     Examination   /72   Pulse 89   Resp 18   Ht 4' 11.25\" (1.505 m)   Wt (!) 263 lb 8 oz (119.5 kg)   SpO2 100%   BMI 52.77 kg/m    Height: 4' 11.25\" (1.505 m) (2019 10:10 AM)  Initial Weight: 263.5 lbs (2019 10:10 AM)  Weight: (!) 263 lb 8 oz " "(119.5 kg) (1/28/2019 10:10 AM)  Weight loss from initial: 0 (1/28/2019 10:10 AM)  % Weight loss: 0 % (1/28/2019 10:10 AM)  BMI (Calculated): 52.8 (1/28/2019 10:10 AM)  SpO2: 100 % (1/28/2019 10:10 AM)  Waist Circumference (In): 51 Inches (1/28/2019 10:10 AM)  Hip Circumference (In): 59 Inches (1/28/2019 10:10 AM)  Neck Circumference (In): 14 Inches (1/28/2019 10:10 AM)    General:  Alert and ambulatory, no distress.   HEENT:  No conjunctival pallor, moist mucous Membranes, neck is supple.  Pulmonary:  Normal respiratory effort, no cough, no audible wheezes/crackles.  CV:  Regular rate and Rhythm, no murmurs, pulses 2 plus  Abdominal: obese centrally. Non tender.  Extremities: trace sock edema  Skin:  No pallor or jaundice.  Pscyh/Mood: intelligent, engaged in the interview and motivated for change despite her hectic life.  \"I do better when I'm busy\"                                     LABS: \"Reviewed the following recent labs.    Last recorded labs include:  Lab Results   Component Value Date    WBC 10.8 10/25/2018    HGB 13.1 10/25/2018    HCT 39.3 10/25/2018    MCV 82 10/25/2018     10/25/2018      Lab Results   Component Value Date    NRKFBCKS33ED 28.5 (L) 10/25/2018    Lab Results   Component Value Date    HGBA1C 5.5 01/28/2019      Lab Results   Component Value Date    CHOL 193 10/25/2018    No results found for: PTH      No results found for: FERRITIN   Lab Results   Component Value Date    HDL 48 (L) 10/25/2018      Lab Results   Component Value Date    KXHWSEZD36 586 01/28/2019    No results found for: 81303   Lab Results   Component Value Date    LDLCALC 112 10/25/2018    Lab Results   Component Value Date    TSH 2.77 10/25/2018    No results found for: FOLATE   Lab Results   Component Value Date    TRIG 164 (H) 10/25/2018    Lab Results   Component Value Date    ALT 21 12/05/2016    AST 24 12/05/2016    ALKPHOS 131 (H) 12/05/2016    BILITOT 0.3 12/05/2016    No results found for: TESTOSTERONE   "   No components found for: CHOLHDL No results found for: 7597   @resusfast(vitamin a: 1)@       Other Notables/imaging:     Counselin minutes spent in direct consultation with the patient regarding conditions contributing to excess weight accumulation, with over 50% of the time spent in counseling, goal setting and initiating a plan to lose their excess weight.    Reilly Allan MD  [facility].  2019           Answers for HPI/ROS submitted by the patient on 2019   BARIATRIC NEW PATIENT  Interested in Surgery?: No  [place] About?: Primary Care Doctor  Barriers to learning:: No  Attended Seminar?: No  PCP:: [facility] - PCP in chart.  Specialist:: OB - [facility]  Mental Health Provider:: NOne  Present Ht:: 60  Present Wt:: 165  Age Overweight:: 27  Age 100lbs:: 28  Wt 18yrs:: 130  Wt 5yrs ago:: 250  # of Wt Loss Efforts:: 5+  Prescribed Wt Loss Meds?: No  OTC Wt Loss Meds?: Yes  OTC Meds:: Hydroxycut  Program1:: [employer]  Start Date1:: 2018  End Date1:: 2018  Total Months1:: 11  Starting Wt1:: 273  Ending Wt1:: 263  Pounds Lost1:: 30  Program2:: [employer]  Program3:: Weight Watchers  Program4:: [place] [facility]  Program5:: [place]

## 2021-06-25 NOTE — PROGRESS NOTES
Non-surgical Weight Loss Initial Diet Evaluation     Assessment:  Pt is a 33 y.o. female being seen today for non-surgical RD nutritional evaluation. Today we reviewed current eating habits and level of physical activity, and instructed on the changes that are required for successful weight loss outcomes.      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 Pt contemplative regarding bariatric surgery.     Personal Goals: weight loss, improved health.   Personal goal weight:  160 lb      Pt Active Problem List Diagnosis:     Patient Active Problem List   Diagnosis     Migraine Headache     Anxiety     Esophageal Reflux     Vitamin D Deficiency     Morbid Obesity     Mild Recurrent Major Depression     Naltrexone:taking     Pt's Initial Weight: 263.5 lbs  Weight: (!) 270 lb (122.5 kg)  Weight loss from initial: -6.5  % Weight loss: -2.47 %  s  BMI: Body mass index is 53.62 kg/m .  IBW: 95 lbs    Estimated RMR ([place]-St Jeor equation): 1833 calories  Protein requirements (.5grams to .9grams per pound IBW, 20-30% of calories, minimum of 60-80gm per day):  55-99 grams     Food allergies, intolerances, Buddhism customs: None.     Vitamins/Mineral Supplementation: vitamin D     Biggest struggle with weight loss: busy lifestyle, poor planning.     Who does the grocery shopping for your household? Pt and    Who prepares your meals at home? Pt and      Diet Recall/Time: Pt wakes up 5:00am   Breakfast: 7am- natalie bar or skips (5g)   Am Snack: none   Lunch: leftovers or aníbal cari sandwich (10-20g)   Pm snack: none   Dinner: ham, green beans, 2 slices toast (20g)   HS Snack: none     Protein: 40-50 grams     Fats used at home: olive oil    Meals per week away from home: 3-7x/week   Sit down: 1x/week   Fast Food: 3x/week for lunch    Specialty Coffee: starbucks white chocolate mocha   Ice Cream/FrozenYogurt/Bakery: none   Comments:  none     Recommended limiting eating out to no more than 2x/week.  Patient and I reviewed the importance of eating three consistent meals per day; as well as meal timing to be spaced 4-5 hours apart.  Snack choices: 100-150 calories (1-2x/day if physically hungry), incorporating a fruit/vegetable w/ protein source.    Portion Sizes problematic? yes per patient/diet recall  Encouraged slowing meal times down, 20-30 minutes, chewing to applesauce consistency.   To aid in proper portion control and slow meal time down discussed consuming meals off smaller plates, use toddler/children utensils and set utensils down after each bite.    Beverages (Type/Oz. per day)  Water: 200 oz   Coffee: none   Tea: none   Milk: 1 glass/day (16oz)   Regular soda: none  Diet soda: 1 can/day   Juice: none   Andre-Aid/lemonade/etc: none   Alcohol: not discussed     Discussed the importance of adequate hydration and the goal of 64+ oz of fluid daily.   The patient understands the importance of avoiding all alcoholic and sweetened drinks, and instead choosing 64 oz plain water.    Exercise  Not discussed.     Pt's understands that 45-60 minutes of daily activity is an important part of weight loss success.   Encouraged pt to incorporate upper body strength training exercise, even if its lifting soup cans while watching tv at night, doing push ups/sit-ups, and abdominal work.    PES statement:    1. (NI-1.3)Excessive energy intake related to Food and nutrition related knowledge deficit concerning excessive energy/oral intake as evidenced by Intake of high caloric density foods at meals and/or snacks; large portions; frequent grazing; Estimated intake that exceeds estimated daily energy intake;  Frequent excessive fast food or restaurant intake; and BMI 53.       Intervention  Discussion:  1. Educated pt on importance of three meals per day, with no snacks.   2. Recommended to consume 20-25gm protein at 3 meals daily. 60-80 grams daily  total.  3. Discussed using a protein bar as a meal replacement.   4. Educated pt on food labels: keeping total fat grams <10, total sugar grams <10, fiber >3gm per serving.   5. Gave food journal to be used as a meal planner  6. Discussed potential dietary changes associated with bariatric surgery.   7. Plate Method: The patient and I discussed the importance of including lean/low  fat protein at each meal and limiting carbohydrate intake to less  than 25% of plate volume.  Instructions/Goals:   1. Include 20-30 gm protein at each meal.  2. Increase vegetable/fruit intake, by having a vegetable or fruit with each meal daily. Recommended pt to increase vegetable/fruit intake to 4-5 servings daily.  3. Increase fluid intake to 64oz daily: choose plain or calorie/alcohol-free beverages.  4. Incorporate daily structured activity, 45-60 minutes most days of the week  5. Read food labels more consistently: keeping total fat grams <10, total sugar grams <10, fiber >3gm per serving.  6. Practice plate method: 1/2 plate lean/low fat protein source, vegetable/fruit, <25% of plate complex carbohydrates.  7. Practice eating off of smaller plates/bowls, chewing to applesauce consistency, taking 20-30 minutes to eat in a calm/relaxed environment without distractions of tv/email/cell phone.    Handouts Provided:  Food label   Plate method  Meal Planner  List of lean protein    Monitor/Evaluation:    Pt will f/u in one month with bariatrician, and f/u in two months with RD.    Plan for next visit with RD:  Review meal planning  Discussed hydration  Educate on fat   Exercise      Time In: 10:00am  Time Out: 11:00am      ABN signed: Yes

## 2021-06-27 ENCOUNTER — HEALTH MAINTENANCE LETTER (OUTPATIENT)
Age: 36
End: 2021-06-27

## 2021-10-17 ENCOUNTER — HEALTH MAINTENANCE LETTER (OUTPATIENT)
Age: 36
End: 2021-10-17

## 2022-07-23 ENCOUNTER — HEALTH MAINTENANCE LETTER (OUTPATIENT)
Age: 37
End: 2022-07-23

## 2022-10-01 ENCOUNTER — HEALTH MAINTENANCE LETTER (OUTPATIENT)
Age: 37
End: 2022-10-01

## 2023-08-12 ENCOUNTER — HEALTH MAINTENANCE LETTER (OUTPATIENT)
Age: 38
End: 2023-08-12